# Patient Record
Sex: FEMALE | Race: WHITE | NOT HISPANIC OR LATINO | Employment: OTHER | URBAN - METROPOLITAN AREA
[De-identification: names, ages, dates, MRNs, and addresses within clinical notes are randomized per-mention and may not be internally consistent; named-entity substitution may affect disease eponyms.]

---

## 2017-01-19 ENCOUNTER — TRANSCRIBE ORDERS (OUTPATIENT)
Dept: ADMINISTRATIVE | Facility: HOSPITAL | Age: 67
End: 2017-01-19

## 2017-01-19 ENCOUNTER — HOSPITAL ENCOUNTER (OUTPATIENT)
Dept: GENERAL RADIOLOGY | Facility: HOSPITAL | Age: 67
Discharge: HOME | End: 2017-01-19

## 2017-01-19 ENCOUNTER — HOSPITAL ENCOUNTER (OUTPATIENT)
Dept: GENERAL RADIOLOGY | Facility: HOSPITAL | Age: 67
Discharge: HOME | End: 2017-01-19
Admitting: FAMILY MEDICINE

## 2017-01-19 DIAGNOSIS — M54.2 NECK PAIN: Primary | ICD-10-CM

## 2017-01-19 DIAGNOSIS — M54.9 OTHER ACUTE BACK PAIN: ICD-10-CM

## 2017-01-19 DIAGNOSIS — M54.2 NECK PAIN: ICD-10-CM

## 2017-01-19 PROCEDURE — 72072 X-RAY EXAM THORAC SPINE 3VWS: CPT

## 2017-01-19 PROCEDURE — 72052 X-RAY EXAM NECK SPINE 6/>VWS: CPT

## 2017-08-15 ENCOUNTER — OFFICE VISIT (OUTPATIENT)
Dept: OBSTETRICS AND GYNECOLOGY | Facility: CLINIC | Age: 67
End: 2017-08-15

## 2017-08-15 VITALS
WEIGHT: 171 LBS | SYSTOLIC BLOOD PRESSURE: 124 MMHG | DIASTOLIC BLOOD PRESSURE: 80 MMHG | BODY MASS INDEX: 25.91 KG/M2 | HEIGHT: 68 IN

## 2017-08-15 DIAGNOSIS — Z01.419 ENCOUNTER FOR GYNECOLOGICAL EXAMINATION WITH PAPANICOLAOU SMEAR OF CERVIX: Primary | ICD-10-CM

## 2017-08-15 LAB
BILIRUB BLD-MCNC: NEGATIVE MG/DL
CLARITY, POC: CLEAR
COLOR UR: YELLOW
GLUCOSE UR STRIP-MCNC: NEGATIVE MG/DL
KETONES UR QL: NEGATIVE
LEUKOCYTE EST, POC: NEGATIVE
NITRITE UR-MCNC: NEGATIVE MG/ML
PH UR: 5 [PH] (ref 5–8)
PROT UR STRIP-MCNC: NEGATIVE MG/DL
RBC # UR STRIP: NEGATIVE /UL
SP GR UR: 1 (ref 1–1.03)
UROBILINOGEN UR QL: NORMAL

## 2017-08-15 PROCEDURE — 99406 BEHAV CHNG SMOKING 3-10 MIN: CPT | Performed by: OBSTETRICS & GYNECOLOGY

## 2017-08-15 PROCEDURE — 81002 URINALYSIS NONAUTO W/O SCOPE: CPT | Performed by: OBSTETRICS & GYNECOLOGY

## 2017-08-15 PROCEDURE — 99387 INIT PM E/M NEW PAT 65+ YRS: CPT | Performed by: OBSTETRICS & GYNECOLOGY

## 2017-08-15 RX ORDER — CETIRIZINE HYDROCHLORIDE 10 MG/1
10 TABLET ORAL
COMMUNITY
Start: 2017-08-02 | End: 2018-08-02

## 2017-08-15 RX ORDER — ALPRAZOLAM 0.5 MG/1
.25-.5 TABLET ORAL 3 TIMES DAILY PRN
COMMUNITY
Start: 2017-07-06

## 2017-08-15 RX ORDER — MECLIZINE HYDROCHLORIDE 25 MG/1
25 TABLET ORAL
COMMUNITY
Start: 2017-08-02 | End: 2022-05-02

## 2017-08-15 RX ORDER — DICLOFENAC SODIUM 75 MG/1
75 TABLET, DELAYED RELEASE ORAL
COMMUNITY
Start: 2017-03-13 | End: 2018-03-13

## 2017-08-15 RX ORDER — CLONIDINE HYDROCHLORIDE 0.2 MG/1
0.2 TABLET ORAL
COMMUNITY
Start: 2017-03-13 | End: 2017-08-15

## 2017-08-15 RX ORDER — ATENOLOL 100 MG/1
50 TABLET ORAL
COMMUNITY
Start: 2017-08-08

## 2017-08-15 RX ORDER — CLONIDINE HYDROCHLORIDE 0.2 MG/1
TABLET ORAL
COMMUNITY
Start: 2017-08-08 | End: 2020-10-30

## 2017-08-15 NOTE — PROGRESS NOTES
GYN Annual Exam     CC- Here for annual exam.     Tammy Montgomery is a 67 y.o. female who presents for annual well woman exam. Periods are absent.     OB History      Para Term  AB TAB SAB Ectopic Multiple Living    3 3        3          Current contraception: post menopausal status  History of abnormal Pap smear: no  Family history of uterine, colon or ovarian cancer: no  History of abnormal mammogram: no  Family history of breast cancer: no  Last Pap : 15 years ago    Past Medical History:   Diagnosis Date   • Anxiety    • Arthritis    • Hypertension        Past Surgical History:   Procedure Laterality Date   • ADENOIDECTOMY     • APPENDECTOMY     • BRONCHOSCOPY ENDOSCOPY     • CHOLECYSTECTOMY     • TONSILLECTOMY     • WISDOM TOOTH EXTRACTION           Current Outpatient Prescriptions:   •  ALPRAZolam (XANAX) 0.5 MG tablet, Take 0.25-0.5 mg by mouth., Disp: , Rfl:   •  atenolol (TENORMIN) 100 MG tablet, TAKE ONE TABLET BY MOUTH ONCE DAILY, Disp: , Rfl:   •  cetirizine (zyrTEC) 10 MG tablet, Take 10 mg by mouth., Disp: , Rfl:   •  diclofenac (VOLTAREN) 75 MG EC tablet, Take 75 mg by mouth., Disp: , Rfl:   •  meclizine (ANTIVERT) 25 MG tablet, Take 25 mg by mouth., Disp: , Rfl:   •  CloNIDine (CATAPRES) 0.2 MG tablet, , Disp: , Rfl:     Allergies   Allergen Reactions   • Codeine    • Methylprednisolone    • Penicillins    • Sulfa Antibiotics    • Tetanus Immune Globulin Swelling       Social History   Substance Use Topics   • Smoking status: Current Some Day Smoker   • Smokeless tobacco: None   • Alcohol use No       History reviewed. No pertinent family history.    Review of Systems   Constitutional: Negative for appetite change, fever and unexpected weight change.   Respiratory: Negative for cough and shortness of breath.    Cardiovascular: Negative for chest pain and palpitations.   Gastrointestinal: Negative for abdominal distention, abdominal pain, constipation, diarrhea, nausea and vomiting.  "  Endocrine: Negative.    Genitourinary: Negative for dyspareunia, menstrual problem, pelvic pain and vaginal discharge.   Skin: Negative.    Hematological: Negative.    Psychiatric/Behavioral: Negative for dysphoric mood and sleep disturbance. The patient is not nervous/anxious.        /80  Ht 68\" (172.7 cm)  Wt 171 lb (77.6 kg)  Breastfeeding? No  BMI 26 kg/m2    Physical Exam   Constitutional: She is oriented to person, place, and time. She appears well-developed and well-nourished.   HENT:   Head: Normocephalic and atraumatic.   Neck: Normal range of motion. Neck supple. No thyromegaly present.   Cardiovascular: Normal rate and regular rhythm.    Pulmonary/Chest: Effort normal and breath sounds normal. Right breast exhibits no mass and no nipple discharge. Left breast exhibits no mass and no nipple discharge. Breasts are symmetrical. There is no breast swelling.   Abdominal: Soft. Bowel sounds are normal. She exhibits no distension and no mass. There is no tenderness. There is no rebound and no guarding.   Genitourinary: Vagina normal and uterus normal. No breast tenderness, discharge or bleeding. Pelvic exam was performed with patient prone. There is no lesion on the right labia. There is no lesion on the left labia. Cervix exhibits no motion tenderness and no discharge. Right adnexum displays no mass. Left adnexum displays no mass.   Musculoskeletal: Normal range of motion. She exhibits no edema.   Neurological: She is alert and oriented to person, place, and time.   Skin: Skin is warm and dry.   Psychiatric: She has a normal mood and affect. Her behavior is normal. Judgment and thought content normal.   Nursing note and vitals reviewed.      Diagnoses and all orders for this visit:    Encounter for gynecological examination with Papanicolaou smear of cervix  -     POC Urinalysis Dipstick  -     Pap IG, HPV-hr    Other orders  -     ALPRAZolam (XANAX) 0.5 MG tablet; Take 0.25-0.5 mg by mouth.  -     " atenolol (TENORMIN) 100 MG tablet; TAKE ONE TABLET BY MOUTH ONCE DAILY  -     cetirizine (zyrTEC) 10 MG tablet; Take 10 mg by mouth.  -     Discontinue: CloNIDine (CATAPRES) 0.2 MG tablet; Take 0.2 mg by mouth.  -     diclofenac (VOLTAREN) 75 MG EC tablet; Take 75 mg by mouth.  -     meclizine (ANTIVERT) 25 MG tablet; Take 25 mg by mouth.  -     CloNIDine (CATAPRES) 0.2 MG tablet;         Assessment     1) GYN annual well woman exam.   2) schedule MMG     Plan     1) Breast Health - Clinical breast exam & mammogram yearly, Self breast awareness monthly  2) Pap - done today  3) Smoking status- Current some day smoker 3-10 mintues spent counseling Will try to cut down  4) Colon health - screening colonoscopy recommended if not up to date  5) Bone health - Weight bearing exercise, dietary calcium recommendations and vitamin D reviewed.   6) Seat belts recommended  7) Follow up prn and one year    Encounter Diagnoses   Name Primary?   • Encounter for gynecological examination with Papanicolaou smear of cervix Yes         Trevon Cummins MD  8/15/2017  1:31 PM

## 2017-08-17 LAB
CYTOLOGIST CVX/VAG CYTO: NORMAL
CYTOLOGY CVX/VAG DOC THIN PREP: NORMAL
DX ICD CODE: NORMAL
HIV 1 & 2 AB SER-IMP: NORMAL
HPV I/H RISK 1 DNA CVX QL PROBE+SIG AMP: NEGATIVE
OTHER STN SPEC: NORMAL
PATH REPORT.FINAL DX SPEC: NORMAL
STAT OF ADQ CVX/VAG CYTO-IMP: NORMAL

## 2018-04-30 ENCOUNTER — TRANSCRIBE ORDERS (OUTPATIENT)
Dept: ADMINISTRATIVE | Facility: HOSPITAL | Age: 68
End: 2018-04-30

## 2018-04-30 DIAGNOSIS — Z12.2 ENCOUNTER FOR SCREENING FOR LUNG CANCER: Primary | ICD-10-CM

## 2018-05-11 ENCOUNTER — APPOINTMENT (OUTPATIENT)
Dept: CT IMAGING | Facility: HOSPITAL | Age: 68
End: 2018-05-11

## 2018-05-18 ENCOUNTER — APPOINTMENT (OUTPATIENT)
Dept: CT IMAGING | Facility: HOSPITAL | Age: 68
End: 2018-05-18

## 2018-06-11 ENCOUNTER — HOSPITAL ENCOUNTER (OUTPATIENT)
Dept: CT IMAGING | Facility: HOSPITAL | Age: 68
Discharge: HOME | End: 2018-06-11

## 2018-06-14 ENCOUNTER — HOSPITAL ENCOUNTER (OUTPATIENT)
Dept: CT IMAGING | Facility: HOSPITAL | Age: 68
Discharge: HOME | End: 2018-06-14
Admitting: FAMILY MEDICINE

## 2018-06-14 DIAGNOSIS — Z12.2 ENCOUNTER FOR SCREENING FOR LUNG CANCER: ICD-10-CM

## 2018-06-14 PROCEDURE — G0297 LDCT FOR LUNG CA SCREEN: HCPCS

## 2018-07-25 ENCOUNTER — TELEPHONE (OUTPATIENT)
Dept: SURGERY | Facility: CLINIC | Age: 68
End: 2018-07-25

## 2018-07-26 ENCOUNTER — PREP FOR SURGERY (OUTPATIENT)
Dept: OTHER | Facility: HOSPITAL | Age: 68
End: 2018-07-26

## 2018-07-26 DIAGNOSIS — Z12.11 SCREENING FOR COLON CANCER: Primary | ICD-10-CM

## 2020-10-30 ENCOUNTER — APPOINTMENT (OUTPATIENT)
Dept: CT IMAGING | Facility: HOSPITAL | Age: 70
End: 2020-10-30

## 2020-10-30 ENCOUNTER — HOSPITAL ENCOUNTER (EMERGENCY)
Facility: HOSPITAL | Age: 70
Discharge: HOME | End: 2020-10-30
Attending: EMERGENCY MEDICINE | Admitting: EMERGENCY MEDICINE

## 2020-10-30 VITALS
TEMPERATURE: 98.4 F | WEIGHT: 181.7 LBS | HEIGHT: 68 IN | DIASTOLIC BLOOD PRESSURE: 61 MMHG | SYSTOLIC BLOOD PRESSURE: 108 MMHG | HEART RATE: 67 BPM | RESPIRATION RATE: 17 BRPM | BODY MASS INDEX: 27.54 KG/M2 | OXYGEN SATURATION: 98 %

## 2020-10-30 DIAGNOSIS — R10.13 EPIGASTRIC ABDOMINAL PAIN: Primary | ICD-10-CM

## 2020-10-30 DIAGNOSIS — R19.7 DIARRHEA, UNSPECIFIED TYPE: ICD-10-CM

## 2020-10-30 LAB
ADV 40+41 DNA STL QL NAA+NON-PROBE: NOT DETECTED
ALBUMIN SERPL-MCNC: 4.1 G/DL (ref 3.5–5.2)
ALBUMIN/GLOB SERPL: 1.4 G/DL
ALP SERPL-CCNC: 56 U/L (ref 39–117)
ALT SERPL W P-5'-P-CCNC: 15 U/L (ref 1–33)
ANION GAP SERPL CALCULATED.3IONS-SCNC: 12.3 MMOL/L (ref 5–15)
AST SERPL-CCNC: 23 U/L (ref 1–32)
ASTRO TYP 1-8 RNA STL QL NAA+NON-PROBE: NOT DETECTED
BASOPHILS # BLD AUTO: 0.03 10*3/MM3 (ref 0–0.2)
BASOPHILS NFR BLD AUTO: 0.3 % (ref 0–1.5)
BILIRUB SERPL-MCNC: 0.6 MG/DL (ref 0–1.2)
BILIRUB UR QL STRIP: NEGATIVE
BUN SERPL-MCNC: 7 MG/DL (ref 8–23)
BUN/CREAT SERPL: 10.3 (ref 7–25)
C CAYETANENSIS DNA STL QL NAA+NON-PROBE: NOT DETECTED
CALCIUM SPEC-SCNC: 8.7 MG/DL (ref 8.6–10.5)
CAMPY SP DNA.DIARRHEA STL QL NAA+PROBE: NOT DETECTED
CHLORIDE SERPL-SCNC: 89 MMOL/L (ref 98–107)
CLARITY UR: CLEAR
CO2 SERPL-SCNC: 22.7 MMOL/L (ref 22–29)
COLOR UR: YELLOW
CREAT SERPL-MCNC: 0.68 MG/DL (ref 0.57–1)
CRYPTOSP STL CULT: NOT DETECTED
DEPRECATED RDW RBC AUTO: 39.2 FL (ref 37–54)
E COLI DNA SPEC QL NAA+PROBE: NOT DETECTED
E HISTOLYT AG STL-ACNC: NOT DETECTED
EAEC PAA PLAS AGGR+AATA ST NAA+NON-PRB: NOT DETECTED
EC STX1 + STX2 GENES STL NAA+PROBE: NOT DETECTED
EOSINOPHIL # BLD AUTO: 0.11 10*3/MM3 (ref 0–0.4)
EOSINOPHIL NFR BLD AUTO: 1 % (ref 0.3–6.2)
EPEC EAE GENE STL QL NAA+NON-PROBE: NOT DETECTED
ERYTHROCYTE [DISTWIDTH] IN BLOOD BY AUTOMATED COUNT: 11.8 % (ref 12.3–15.4)
ETEC LTA+ST1A+ST1B TOX ST NAA+NON-PROBE: NOT DETECTED
G LAMBLIA DNA SPEC QL NAA+PROBE: NOT DETECTED
GFR SERPL CREATININE-BSD FRML MDRD: 85 ML/MIN/1.73
GLOBULIN UR ELPH-MCNC: 2.9 GM/DL
GLUCOSE SERPL-MCNC: 117 MG/DL (ref 65–99)
GLUCOSE UR STRIP-MCNC: NEGATIVE MG/DL
HCT VFR BLD AUTO: 37.3 % (ref 34–46.6)
HGB BLD-MCNC: 12.6 G/DL (ref 12–15.9)
HGB UR QL STRIP.AUTO: NEGATIVE
IMM GRANULOCYTES # BLD AUTO: 0.03 10*3/MM3 (ref 0–0.05)
IMM GRANULOCYTES NFR BLD AUTO: 0.3 % (ref 0–0.5)
KETONES UR QL STRIP: NEGATIVE
LEUKOCYTE ESTERASE UR QL STRIP.AUTO: NEGATIVE
LIPASE SERPL-CCNC: 18 U/L (ref 13–60)
LYMPHOCYTES # BLD AUTO: 0.93 10*3/MM3 (ref 0.7–3.1)
LYMPHOCYTES NFR BLD AUTO: 8.2 % (ref 19.6–45.3)
MCH RBC QN AUTO: 30.7 PG (ref 26.6–33)
MCHC RBC AUTO-ENTMCNC: 33.8 G/DL (ref 31.5–35.7)
MCV RBC AUTO: 91 FL (ref 79–97)
MONOCYTES # BLD AUTO: 1.38 10*3/MM3 (ref 0.1–0.9)
MONOCYTES NFR BLD AUTO: 12.2 % (ref 5–12)
NEUTROPHILS NFR BLD AUTO: 78 % (ref 42.7–76)
NEUTROPHILS NFR BLD AUTO: 8.85 10*3/MM3 (ref 1.7–7)
NITRITE UR QL STRIP: NEGATIVE
NOROVIRUS GI+II RNA STL QL NAA+NON-PROBE: NOT DETECTED
NRBC BLD AUTO-RTO: 0 /100 WBC (ref 0–0.2)
P SHIGELLOIDES DNA STL QL NAA+PROBE: NOT DETECTED
PH UR STRIP.AUTO: <=5 [PH] (ref 4.5–8)
PLATELET # BLD AUTO: 243 10*3/MM3 (ref 140–450)
PMV BLD AUTO: 9.5 FL (ref 6–12)
POTASSIUM SERPL-SCNC: 3.7 MMOL/L (ref 3.5–5.2)
PROT SERPL-MCNC: 7 G/DL (ref 6–8.5)
PROT UR QL STRIP: NEGATIVE
QT INTERVAL: 391 MS
RBC # BLD AUTO: 4.1 10*6/MM3 (ref 3.77–5.28)
RV RNA STL NAA+PROBE: NOT DETECTED
SALMONELLA DNA SPEC QL NAA+PROBE: NOT DETECTED
SAPO I+II+IV+V RNA STL QL NAA+NON-PROBE: NOT DETECTED
SARS-COV-2 RNA PNL SPEC NAA+PROBE: NOT DETECTED
SHIGELLA SP+EIEC IPAH STL QL NAA+PROBE: NOT DETECTED
SODIUM SERPL-SCNC: 124 MMOL/L (ref 136–145)
SP GR UR STRIP: <=1.005 (ref 1–1.03)
TROPONIN T SERPL-MCNC: <0.01 NG/ML (ref 0–0.03)
TROPONIN T SERPL-MCNC: <0.01 NG/ML (ref 0–0.03)
UROBILINOGEN UR QL STRIP: NORMAL
V CHOLERAE DNA SPEC QL NAA+PROBE: NOT DETECTED
VIBRIO DNA SPEC NAA+PROBE: NOT DETECTED
WBC # BLD AUTO: 11.33 10*3/MM3 (ref 3.4–10.8)
YERSINIA STL CULT: NOT DETECTED

## 2020-10-30 PROCEDURE — 96372 THER/PROPH/DIAG INJ SC/IM: CPT

## 2020-10-30 PROCEDURE — 96374 THER/PROPH/DIAG INJ IV PUSH: CPT

## 2020-10-30 PROCEDURE — 0097U HC BIOFIRE FILMARRAY GI PANEL: CPT | Performed by: EMERGENCY MEDICINE

## 2020-10-30 PROCEDURE — 87635 SARS-COV-2 COVID-19 AMP PRB: CPT | Performed by: EMERGENCY MEDICINE

## 2020-10-30 PROCEDURE — 93010 ELECTROCARDIOGRAM REPORT: CPT | Performed by: INTERNAL MEDICINE

## 2020-10-30 PROCEDURE — 99284 EMERGENCY DEPT VISIT MOD MDM: CPT

## 2020-10-30 PROCEDURE — 74177 CT ABD & PELVIS W/CONTRAST: CPT

## 2020-10-30 PROCEDURE — 83690 ASSAY OF LIPASE: CPT | Performed by: EMERGENCY MEDICINE

## 2020-10-30 PROCEDURE — 85025 COMPLETE CBC W/AUTO DIFF WBC: CPT | Performed by: EMERGENCY MEDICINE

## 2020-10-30 PROCEDURE — 25010000002 DICYCLOMINE PER 20 MG: Performed by: EMERGENCY MEDICINE

## 2020-10-30 PROCEDURE — 99283 EMERGENCY DEPT VISIT LOW MDM: CPT | Performed by: EMERGENCY MEDICINE

## 2020-10-30 PROCEDURE — 0 IOPAMIDOL PER 1 ML: Performed by: EMERGENCY MEDICINE

## 2020-10-30 PROCEDURE — 93005 ELECTROCARDIOGRAM TRACING: CPT | Performed by: EMERGENCY MEDICINE

## 2020-10-30 PROCEDURE — 84484 ASSAY OF TROPONIN QUANT: CPT | Performed by: EMERGENCY MEDICINE

## 2020-10-30 PROCEDURE — 80053 COMPREHEN METABOLIC PANEL: CPT | Performed by: EMERGENCY MEDICINE

## 2020-10-30 PROCEDURE — 81003 URINALYSIS AUTO W/O SCOPE: CPT | Performed by: EMERGENCY MEDICINE

## 2020-10-30 RX ORDER — FAMOTIDINE 20 MG/1
20 TABLET, FILM COATED ORAL 2 TIMES DAILY
Qty: 30 TABLET | Refills: 0 | Status: SHIPPED | OUTPATIENT
Start: 2020-10-30 | End: 2021-05-05

## 2020-10-30 RX ORDER — ONDANSETRON 8 MG/1
8 TABLET, ORALLY DISINTEGRATING ORAL EVERY 8 HOURS PRN
Qty: 10 TABLET | Refills: 0 | Status: SHIPPED | OUTPATIENT
Start: 2020-10-30 | End: 2022-05-02

## 2020-10-30 RX ORDER — LEVOCETIRIZINE DIHYDROCHLORIDE 5 MG/1
5 TABLET, FILM COATED ORAL EVERY EVENING
COMMUNITY

## 2020-10-30 RX ORDER — SUCRALFATE 1 G/1
1 TABLET ORAL ONCE
Status: COMPLETED | OUTPATIENT
Start: 2020-10-30 | End: 2020-10-30

## 2020-10-30 RX ORDER — DICYCLOMINE HYDROCHLORIDE 10 MG/ML
20 INJECTION INTRAMUSCULAR ONCE
Status: COMPLETED | OUTPATIENT
Start: 2020-10-30 | End: 2020-10-30

## 2020-10-30 RX ORDER — SACCHAROMYCES BOULARDII 250 MG
250 CAPSULE ORAL 2 TIMES DAILY
COMMUNITY

## 2020-10-30 RX ORDER — FAMOTIDINE 10 MG/ML
20 INJECTION, SOLUTION INTRAVENOUS ONCE
Status: COMPLETED | OUTPATIENT
Start: 2020-10-30 | End: 2020-10-30

## 2020-10-30 RX ORDER — AMLODIPINE BESYLATE 2.5 MG/1
2.5 TABLET ORAL DAILY
COMMUNITY

## 2020-10-30 RX ORDER — SUCRALFATE ORAL 1 G/10ML
1 SUSPENSION ORAL 4 TIMES DAILY
Qty: 420 ML | Refills: 0 | Status: SHIPPED | OUTPATIENT
Start: 2020-10-30 | End: 2021-05-05

## 2020-10-30 RX ADMIN — DICYCLOMINE HYDROCHLORIDE 20 MG: 20 INJECTION, SOLUTION INTRAMUSCULAR at 11:39

## 2020-10-30 RX ADMIN — SUCRALFATE 1 G: 1 TABLET ORAL at 14:40

## 2020-10-30 RX ADMIN — IOPAMIDOL 100 ML: 755 INJECTION, SOLUTION INTRAVENOUS at 13:40

## 2020-10-30 RX ADMIN — SODIUM CHLORIDE 1000 ML: 9 INJECTION, SOLUTION INTRAVENOUS at 11:38

## 2020-10-30 RX ADMIN — FAMOTIDINE 20 MG: 10 INJECTION INTRAVENOUS at 11:39

## 2020-10-31 LAB — QT INTERVAL: 389 MS

## 2020-11-02 ENCOUNTER — OFFICE VISIT (OUTPATIENT)
Dept: GASTROENTEROLOGY | Facility: CLINIC | Age: 70
End: 2020-11-02

## 2020-11-02 VITALS — TEMPERATURE: 97.9 F | HEIGHT: 68 IN | BODY MASS INDEX: 26.61 KG/M2 | WEIGHT: 175.6 LBS

## 2020-11-02 DIAGNOSIS — R19.7 DIARRHEA, UNSPECIFIED TYPE: ICD-10-CM

## 2020-11-02 DIAGNOSIS — K57.30 SIGMOID DIVERTICULOSIS: ICD-10-CM

## 2020-11-02 DIAGNOSIS — R93.5 ABNORMAL CT OF THE ABDOMEN: Primary | ICD-10-CM

## 2020-11-02 DIAGNOSIS — Z86.010 PERSONAL HISTORY OF COLONIC POLYPS: ICD-10-CM

## 2020-11-02 DIAGNOSIS — Z12.11 ENCOUNTER FOR SCREENING FOR MALIGNANT NEOPLASM OF COLON: ICD-10-CM

## 2020-11-02 PROBLEM — Z86.0100 PERSONAL HISTORY OF COLONIC POLYPS: Status: ACTIVE | Noted: 2020-11-02

## 2020-11-02 PROCEDURE — 99215 OFFICE O/P EST HI 40 MIN: CPT | Performed by: NURSE PRACTITIONER

## 2020-11-02 RX ORDER — MONTELUKAST SODIUM 4 MG/1
2 TABLET, CHEWABLE ORAL
Qty: 180 TABLET | Refills: 3 | Status: SHIPPED | OUTPATIENT
Start: 2020-11-02 | End: 2021-05-05

## 2020-11-02 NOTE — PATIENT INSTRUCTIONS
Take 2 colestid pills an hour before bed.  Take immodium OTC as needed with each episode of diarrhea up to four times a day.  You may take 3 colestid at bedtime if diarrhea is not improving otherwise just take 2 each night an hour before your Tyl PM.    EGD & colonoscopy.

## 2020-11-02 NOTE — PROGRESS NOTES
"    PATIENT INFORMATION  Tammy Montgomery       - 1950    CHIEF COMPLAINT  Chief Complaint   Patient presents with   • Diarrhea       HISTORY OF PRESENT ILLNESS  Diarrhea started 2 months ago when she was on the KETO diet. 6-7 times a day.   Then lost 4lbs so she stopped the diet and went back to her regular food and the diarrhea started. She has been to multiple ERs at [facility] and [facility] with cdiff and stool studies neg.  No blood, but some yellow at the end.  HB with belching occasionally and a lot of gas.      Takes probiotic.  TX with flagyl Oct 3 in [facility] Er initially and did not help and \"tore her up\".   Other workup normal.      (last: 2018 colon Dr. Busby 2yr 4TA/SA  and sigmoid diverticulosis in [facility] documents)      CT and last colon results listed below.       REVIEWED PERTINENT RESULTS/ LABS  No results found for: CASEREPORT, FINALDX  Lab Results   Component Value Date    HGB 12.6 10/30/2020    MCV 91.0 10/30/2020     10/30/2020    ALT 15 10/30/2020    AST 23 10/30/2020    HGBA1C 4.5 2018    INR 1.0 2015    TRIG 46 2020    FERRITIN 90.9 2019    IRON 75 2019    TIBC 327 2019      Ct Abdomen Pelvis With Contrast    Result Date: 10/30/2020  Narrative: CT Abdomen Pelvis W INDICATION: 70-year-old emergency department patient complaining of upper abdominal pain and lower chest pain and tightness for one week. TECHNIQUE: CT of the abdomen and pelvis with Isovue-370-100 cc IV contrast. Coronal and sagittal reconstructions were obtained.  Radiation dose reduction techniques included automated exposure control or exposure modulation based on body size. Count of known CT and cardiac nuc med studies performed in previous 12 months: 0. COMPARISON: None available. FINDINGS: Abdomen: The included images through the lung bases demonstrate a benign calcified granuloma at the lateral right lung base. There is no acute ovary density or effusion. The distal esophagus appears " normal The liver is normal in size. There is marked intra and extrahepatic bile duct dilatation diffusely with common bile duct measuring up to 2 cm. While some dilatation of the common bile duct can be a normal finding in a postcholecystectomy patient and degree of intra and extrahepatic bile duct dilatation in this case appears abnormal. Suggest correlation with bilirubin levels. Consider follow-up MRCP or ERCP. There is mild dilatation of the pancreatic duct in the region of the head measuring 5 mm. Question is raised of a small enhancing nodule between the second portion of the duodenum and the head of the pancreas measuring 1.3 x 1.3 x 1.7 cm on image 29 series 2 and image 38 series 602.. No definite stone is seen. The adrenal glands are normal. The kidneys are normal in size without obstruction. There is a 6 mm benign simple cyst in the lower pole measuring only 7 Hounsfield units. This requires no additional workup or follow-up imaging. There is atherosclerotic calcification of the abdominal aorta but no aneurysm. Unopacified stomach and small bowel appear normal. There is no colonic distention or wall thickening. Pelvis: The bladder is distended. No wall thickening or stone. As a small postmenopausal uterus. No adnexal mass or pelvic free fluid. The rectosigmoid colon is poorly distended. Question wall thickening in the mid sigmoid colon (image 51 series 2)     Impression: 1. There is cholecystectomy change with marked intra and extrahepatic bile duct dilatation greater than typically seen in a postcholecystectomy patient. In addition, there is dilatation of the pancreatic duct in the region of the head of the pancreas measuring 5 mm. Question small enhancing nodule between the distal second portion of the duodenum and the pancreatic head is raised. Follow-up evaluation with MRCP or ERCP is recommended. 2. Poorly distended and opacified colon. Question wall thickening in the mid sigmoid colon. This could  represent lack of distention, colitis. Malignancy is felt less likely. Correlation with colonoscopy is recommended if not recently performed. Signer Name: Jody Darling MD  Signed: 10/30/2020 1:50 PM  Workstation Name: [id number]  [facility]    10/5/20 CT with contrast at [facility]  The gallbladder is absent. There is increased biliary ductal dilation with the main bile duct measuring up to 1.9 cm, previously 1.1 cm. The pancreatic duct is mildly dilated measuring up to 5 mm, previously within normal limits. There is no mass at the   pancreatic head. No peripancreatic inflammation or fluid collection. The spleen, adrenals, and kidneys are unremarkable.    The bowel is not dilated. There is no abnormal bowel wall thickening. Liquid stool is present throughout the colon and small bowel compatible with history of diarrheal illness. No free air, free fluid, or focal fluid collection. No abdominal or pelvic   lymphadenopathy. No adnexal masses. There is moderate aortoiliac atherosclerosis with ectasia of the infrarenal abdominal aorta. Urinary bladder is distended without wall thickening.    No acute osseous abnormality.    IMPRESSION:      1.No acute intra-abdominal findings. Liquid stool within the colon compatible with history of diarrheal illness.  2.Increased biliary ductal dilation with new borderline pancreatic duct dilation. Given normal serum bilirubin this could represent a low-grade ampullary stenosis or increased reservoir effect from prior cholecystectomy.  3.Atherosclerosis.    8/9/2018 EGD/CLS  Post-operative Diagnosis:   1. Normal esophagus except about 2 cm hiatal hernia. No esophagitis seen.  2. Mild antral gastritis. Biopsy from antrum done for CARY test to r/o H pylori.  3. Vague external bulging in the gastric fundus  4. Normal duodenum. Small bowel biopsy done to r/o celiac disease.  5. Normal terminal ileum  6. Four polyps- cecum x 2, ascending colon x 2. Polypectomies  done  7. Minimal sigmoid diverticulosis otherwise normal colon. Random colon biopsy done.  8. Small hemorrhoids    PATH:    DIAGNOSIS:  A. SMALL BOWEL, BIOPSY:       FRAGMENTS OF SMALL BOWEL MUCOSA WITH PRESERVED VILLOUS ARCHITECTURE.        NEGATIVE FOR INCREASED INTRAEPITHELIAL LYMPHOCYTES.       NEGATIVE FOR DYSPLASIA.     B. APPENDIX ORIFICE, POLYP, EXCISION:       FRAGMENTS OF SESSILE SERRATED POLYP.          C. CECUM, POLYP, EXCISION:       TUBULAR ADENOMA.        NEGATIVE FOR HIGH GRADE DYSPLASIA.          D. COLON, RANDOM BIOPSY:       BENIGN FRAGMENTS OF COLONIC MUCOSA.        NEGATIVE FOR INCREASED INTRAEPITHELIAL LYMPHOCYTES.        NEGATIVE FOR INCREASED BASEMENT MEMBRANE THICKNESS.        NEGATIVE FOR DYSPLASIA.     E. ASCENDING COLON, POLYPS, EXCISION:       FRAGMENTS SESSILE SERRATED POLYPS.     Followed by hematology. On prednisone for hemolytic anemia and has a little constipation on it. Prune juice prn is helping. BM most days.   No abdomen pain.   No black or bloody stools.   Weight gain 8 #.   No heartburn/reflux. No dysphagia.   She has tapered her prednisone to 5 mg daily.   HGB now normal. 12.8. Borderline B 12 level. Taking Vit D and multivitamin.   No FH/colon polyps.   CLS in 2 years due to 4 precancerous polyps.       REVIEW OF SYSTEMS  Review of Systems   Gastrointestinal: Positive for diarrhea.   All other systems reviewed and are negative.        ACTIVE PROBLEMS  Patient Active Problem List    Diagnosis   • Sigmoid diverticulosis [K57.30]   • Personal history of colonic polyps [Z86.010]         PAST MEDICAL HISTORY  Past Medical History:   Diagnosis Date   • Anxiety    • Arthritis    • GERD (gastroesophageal reflux disease)    • Hypertension          SURGICAL HISTORY  Past Surgical History:   Procedure Laterality Date   • ADENOIDECTOMY     • APPENDECTOMY     • BRONCHOSCOPY ENDOSCOPY     • CHOLECYSTECTOMY     • TONSILLECTOMY     • WISDOM TOOTH EXTRACTION           FAMILY HISTORY  Family  "History   Problem Relation Age of Onset   • Colon cancer Neg Hx    • Colon polyps Neg Hx          SOCIAL HISTORY  Social History     Occupational History   • Not on file   Tobacco Use   • Smoking status: Former Smoker     Types: Cigarettes   • Smokeless tobacco: Never Used   Substance and Sexual Activity   • Alcohol use: No   • Drug use: No   • Sexual activity: Yes     Partners: Male         CURRENT MEDICATIONS    Current Outpatient Medications:   •  ALPRAZolam (XANAX) 0.5 MG tablet, Take 0.25-0.5 mg by mouth 3 (Three) Times a Day As Needed., Disp: , Rfl:   •  amLODIPine (NORVASC) 2.5 MG tablet, Take 2.5 mg by mouth Daily., Disp: , Rfl:   •  atenolol (TENORMIN) 100 MG tablet, 50 mg., Disp: , Rfl:   •  colestipol (COLESTID) 1 g tablet, Take 2 tablets by mouth every night at bedtime. If diarrhea improving continue with 2, if diarrhea not improving go up to 3., Disp: 180 tablet, Rfl: 3  •  famotidine (PEPCID) 20 MG tablet, Take 1 tablet by mouth 2 (Two) Times a Day., Disp: 30 tablet, Rfl: 0  •  levocetirizine (XYZAL) 5 MG tablet, Take 5 mg by mouth Every Evening., Disp: , Rfl:   •  meclizine (ANTIVERT) 25 MG tablet, Take 25 mg by mouth., Disp: , Rfl:   •  Multiple Vitamins-Minerals (CENTRUM SILVER PO), Take  by mouth., Disp: , Rfl:   •  ondansetron ODT (Zofran ODT) 8 MG disintegrating tablet, Place 1 tablet on the tongue Every 8 (Eight) Hours As Needed for Nausea or Vomiting., Disp: 10 tablet, Rfl: 0  •  saccharomyces boulardii (FLORASTOR) 250 MG capsule, Take 250 mg by mouth 2 (Two) Times a Day., Disp: , Rfl:   •  sucralfate (CARAFATE) 1 GM/10ML suspension, Take 10 mL by mouth 4 (Four) Times a Day., Disp: 420 mL, Rfl: 0  •  VITAMIN D PO, Take  by mouth., Disp: , Rfl:     ALLERGIES  Codeine, Methylprednisolone, Penicillins, Sulfa antibiotics, and Tetanus immune globulin    VITALS  Vitals:    11/02/20 1401   Temp: 97.9 °F (36.6 °C)   TempSrc: Temporal   Weight: 79.7 kg (175 lb 9.6 oz)   Height: 172.7 cm (67.99\") " "      PHYSICAL EXAM  Debilities/Disabilities Identified: None  Emotional Behavior: Appropriate  Wt Readings from Last 3 Encounters:   11/02/20 79.7 kg (175 lb 9.6 oz)   10/30/20 82.4 kg (181 lb 11.2 oz)   08/15/17 77.6 kg (171 lb)     Ht Readings from Last 1 Encounters:   11/02/20 172.7 cm (67.99\")     Body mass index is 26.71 kg/m².  Physical Exam  Vitals signs and nursing note reviewed.   Constitutional:       Appearance: She is well-developed.   HENT:      Head: Normocephalic and atraumatic.   Eyes:      Conjunctiva/sclera: Conjunctivae normal.      Pupils: Pupils are equal, round, and reactive to light.   Neck:      Musculoskeletal: Normal range of motion and neck supple.   Cardiovascular:      Rate and Rhythm: Normal rate and regular rhythm.   Pulmonary:      Effort: Pulmonary effort is normal.      Breath sounds: Normal breath sounds.   Abdominal:      General: Bowel sounds are normal. There is no distension.      Palpations: Abdomen is soft.      Tenderness: There is abdominal tenderness in the right upper quadrant, right lower quadrant, epigastric area and left lower quadrant.   Musculoskeletal: Normal range of motion.   Lymphadenopathy:      Cervical: No cervical adenopathy.   Skin:     General: Skin is warm and dry.   Neurological:      Mental Status: She is alert and oriented to person, place, and time.   Psychiatric:         Behavior: Behavior normal.         CLINICAL DATA REVIEWED   reviewed previous lab results and integrated with today's visit, reviewed notes from other physicians and/or last GI encounter, reviewed previous endoscopy results and available photos, reviewed surgical pathology results from previous biopsies, consulted with Dr. Dias on CT results and plan of care.     ASSESSMENT  Diagnoses and all orders for this visit:    Abnormal CT of the abdomen  -     Case Request; Standing  -     Follow Anesthesia Guidelines / Protocol; Future  -     Obtain Informed Consent; Standing  -     " Case Request    Diarrhea, unspecified type  -     colestipol (COLESTID) 1 g tablet; Take 2 tablets by mouth every night at bedtime. If diarrhea improving continue with 2, if diarrhea not improving go up to 3.  -     Case Request; Standing  -     Follow Anesthesia Guidelines / Protocol; Future  -     Obtain Informed Consent; Standing  -     Case Request    Encounter for screening for malignant neoplasm of colon  -     Case Request; Standing  -     Follow Anesthesia Guidelines / Protocol; Future  -     Obtain Informed Consent; Standing  -     Case Request    Personal history of colonic polyps  -     Case Request; Standing  -     Follow Anesthesia Guidelines / Protocol; Future  -     Obtain Informed Consent; Standing  -     Case Request    Sigmoid diverticulosis          PLAN  Return in about 3 months (around 2/2/2021).     Take 2 colestid pills an hour before bed.  Take immodium OTC as needed with each episode of diarrhea up to four times a day.  You may take 3 colestid at bedtime if diarrhea is not improving.      EGD & colonoscopy. (last: 2018 colon Dr. Busby 2yr 4TA/SA  and sigmoid diverticulosis in [facility] documents)    Will wait for results of egd before starting on HB medication since she is reluctant to take meds.     I have discussed the above plan with the patient.  They verbalize understanding and are in agreement with the plan.  They have been advised to contact the office for any questions, concerns, or changes related to their health.    45 min spent with patient today >50% spent counseling about natural history and expected course of assessed complaint and reviewed treatment options that have been tried and not tried and those currently available. Questions answered.

## 2020-11-03 ENCOUNTER — TELEPHONE (OUTPATIENT)
Dept: GASTROENTEROLOGY | Facility: CLINIC | Age: 70
End: 2020-11-03

## 2020-11-03 PROBLEM — R19.7 DIARRHEA: Status: ACTIVE | Noted: 2020-11-03

## 2020-11-03 PROBLEM — R93.5 ABNORMAL CT OF THE ABDOMEN: Status: ACTIVE | Noted: 2020-11-03

## 2020-11-03 PROBLEM — Z12.11 ENCOUNTER FOR SCREENING FOR MALIGNANT NEOPLASM OF COLON: Status: ACTIVE | Noted: 2020-11-03

## 2020-11-03 NOTE — TELEPHONE ENCOUNTER
Discussed that she may take 2 colestid an hour before bed and 2 mid morning and take these for a couple of weeks and see how she is doing.   Also enc to drink lots of fluids.  Discussed that bile can cause her poop to be yellow but she needs to continue on this for a couple of weeks.  She is very anxious so reassured her.

## 2020-11-03 NOTE — TELEPHONE ENCOUNTER
"PT IS ASKING IF SHE CAN TAKE THE COLESTIPOL IN THE MORNING INSTEAD OF AT BEDTIME.  SHE ALSO WANTS YOU TO KNOW SHE'S PASSING WHITISH, YELLOWISH, FLUFFY \"STUFF\" THIS MORNING.  PT # [id number]  "

## 2020-11-09 ENCOUNTER — TELEPHONE (OUTPATIENT)
Dept: GASTROENTEROLOGY | Facility: CLINIC | Age: 70
End: 2020-11-09

## 2020-11-09 ENCOUNTER — DOCUMENTATION (OUTPATIENT)
Dept: GASTROENTEROLOGY | Facility: CLINIC | Age: 70
End: 2020-11-09

## 2020-11-09 NOTE — TELEPHONE ENCOUNTER
"PT WOULD LIKE A CALL BACK TO DISCUSS HER \"ABNORMAL\" CT--THIS IS THE WORDING SHE SAYS IS IN HER PAPERWORK.  PLEASE CALL # [phone].  "

## 2020-11-09 NOTE — TELEPHONE ENCOUNTER
I called and discussed that her diarrhea and CTs w abn but prev CT in 2018 also showed sig thickening and was diverticulosis on colon by Dr. Busby.  The pancreatic duct is mildly dilated to 5mm on CT from [facility] on 10/5/20 and [facility] on 10/30/20.  So no change on recent CT.  She is picking up her carafate today to start taking and instructions given for this.  Also her diarrhea has improved to small formed stools 3-4x a day on 2 colestid at betime so she may increase to 3 colestid at bedtime and see if the diarrhea continues to improve.

## 2020-11-16 DIAGNOSIS — R93.5 ABNORMAL CT OF THE ABDOMEN: Primary | ICD-10-CM

## 2020-11-16 NOTE — PROGRESS NOTES
She is taking the colestipol 2 at night and just today had her first normal bm.  Called and discussed abnormal CT from both 10/30/20 ER visit and 10/5/20 ER visit at [facility].  Dilation of pancreatic duct 5mm in the region of the head and recommended MRCP follow up.  Reviewed these reports with Dr. Dias and he agrees to order MRCP.  I called Tammy and let her know that I have ordered an MRCP to get a better picture of this (is not concerning at this time d/t normal pancreatic and liver lab values but recommended f/u.)  She verb understanding.  Is nervous about getting medical care in the midst of covid.  She is scheduled for her EGD and colonoscopy on 12/8 with Dr. Dias.  Enc to wear mask and wash hands frequently.  Will proceed as planned.

## 2020-11-23 ENCOUNTER — TELEPHONE (OUTPATIENT)
Dept: SURGERY | Facility: CLINIC | Age: 70
End: 2020-11-23

## 2020-11-24 NOTE — TELEPHONE ENCOUNTER
She is now having one normal bm a day on 2 colestipol at bedtime and wants to know if she should keep taking them.  Explained that this is what has returned her to normal so stay on this permanently.

## 2020-11-30 ENCOUNTER — TRANSCRIBE ORDERS (OUTPATIENT)
Dept: ADMINISTRATIVE | Facility: HOSPITAL | Age: 70
End: 2020-11-30

## 2020-11-30 DIAGNOSIS — U07.1 COVID-19: Primary | ICD-10-CM

## 2020-12-01 ENCOUNTER — TELEPHONE (OUTPATIENT)
Dept: SURGERY | Facility: CLINIC | Age: 70
End: 2020-12-01

## 2020-12-01 NOTE — TELEPHONE ENCOUNTER
PT IS CONCERNED ABOUT HER UPCOMING MRI.  SHE WILL HAVE TO SPEND 45 MINUTES IN THE TUBE AND HAS VERY HIGH ANXIETY ABOUT THAT.  PLEASE CALL # [phone]

## 2020-12-01 NOTE — TELEPHONE ENCOUNTER
I called and spoke to Tammy and instructed her to take an additional 0.5 mg of xanax on arrival before her procedure for anxiety.

## 2020-12-02 ENCOUNTER — HOSPITAL ENCOUNTER (OUTPATIENT)
Dept: MRI IMAGING | Facility: HOSPITAL | Age: 70
Discharge: HOME | End: 2020-12-02
Admitting: NURSE PRACTITIONER

## 2020-12-02 DIAGNOSIS — R93.5 ABNORMAL CT OF THE ABDOMEN: ICD-10-CM

## 2020-12-02 PROCEDURE — A9577 INJ MULTIHANCE: HCPCS | Performed by: NURSE PRACTITIONER

## 2020-12-02 PROCEDURE — 74183 MRI ABD W/O CNTR FLWD CNTR: CPT

## 2020-12-02 PROCEDURE — 0 GADOBENATE DIMEGLUMINE 529 MG/ML SOLUTION: Performed by: NURSE PRACTITIONER

## 2020-12-02 RX ADMIN — GADOBENATE DIMEGLUMINE 15 ML: 529 INJECTION, SOLUTION INTRAVENOUS at 11:20

## 2020-12-03 ENCOUNTER — TELEPHONE (OUTPATIENT)
Dept: GASTROENTEROLOGY | Facility: CLINIC | Age: 70
End: 2020-12-03

## 2020-12-03 NOTE — TELEPHONE ENCOUNTER
----- Message from MELISSA Egan sent at 12/3/2020 12:37 PM EST -----  Regarding: RE: MEDICATION  Yes as this will delay her colon prep results but she may start again right away after the colonoscopy.  ----- Message -----  From: Shayna Landin  Sent: 12/3/2020  11:07 AM EST  To: MELISSA Egan  Subject: MEDICATION                                       Call from patient.  She spoke with about her MRI results.  Forgot to ask, does she need to stop Colestipol prior to her EGD & Colon on 12/08/2020.  Please advise.  THANK YOU!!

## 2020-12-05 ENCOUNTER — LAB (OUTPATIENT)
Dept: LAB | Facility: HOSPITAL | Age: 70
End: 2020-12-05

## 2020-12-05 DIAGNOSIS — U07.1 COVID-19: ICD-10-CM

## 2020-12-05 PROCEDURE — C9803 HOPD COVID-19 SPEC COLLECT: HCPCS

## 2020-12-05 PROCEDURE — U0004 COV-19 TEST NON-CDC HGH THRU: HCPCS | Performed by: OBSTETRICS & GYNECOLOGY

## 2020-12-06 LAB — SARS-COV-2 RNA RESP QL NAA+PROBE: NOT DETECTED

## 2020-12-07 ENCOUNTER — ANESTHESIA EVENT (OUTPATIENT)
Dept: PERIOP | Facility: HOSPITAL | Age: 70
End: 2020-12-07

## 2020-12-08 ENCOUNTER — ANESTHESIA (OUTPATIENT)
Dept: PERIOP | Facility: HOSPITAL | Age: 70
End: 2020-12-08

## 2020-12-08 ENCOUNTER — HOSPITAL ENCOUNTER (OUTPATIENT)
Facility: HOSPITAL | Age: 70
Setting detail: HOSPITAL OUTPATIENT SURGERY
Discharge: HOME | End: 2020-12-08
Attending: INTERNAL MEDICINE | Admitting: INTERNAL MEDICINE

## 2020-12-08 VITALS
HEART RATE: 79 BPM | RESPIRATION RATE: 12 BRPM | TEMPERATURE: 97.5 F | SYSTOLIC BLOOD PRESSURE: 119 MMHG | WEIGHT: 169.2 LBS | HEIGHT: 68 IN | DIASTOLIC BLOOD PRESSURE: 91 MMHG | BODY MASS INDEX: 25.64 KG/M2 | OXYGEN SATURATION: 99 %

## 2020-12-08 DIAGNOSIS — Z12.11 ENCOUNTER FOR SCREENING FOR MALIGNANT NEOPLASM OF COLON: ICD-10-CM

## 2020-12-08 DIAGNOSIS — R19.7 DIARRHEA, UNSPECIFIED TYPE: ICD-10-CM

## 2020-12-08 DIAGNOSIS — Z86.010 PERSONAL HISTORY OF COLONIC POLYPS: ICD-10-CM

## 2020-12-08 DIAGNOSIS — R93.5 ABNORMAL CT OF THE ABDOMEN: ICD-10-CM

## 2020-12-08 PROCEDURE — 45385 COLONOSCOPY W/LESION REMOVAL: CPT | Performed by: INTERNAL MEDICINE

## 2020-12-08 PROCEDURE — 25010000002 PROPOFOL 10 MG/ML EMULSION: Performed by: NURSE ANESTHETIST, CERTIFIED REGISTERED

## 2020-12-08 PROCEDURE — 45380 COLONOSCOPY AND BIOPSY: CPT | Performed by: INTERNAL MEDICINE

## 2020-12-08 PROCEDURE — 43239 EGD BIOPSY SINGLE/MULTIPLE: CPT | Performed by: INTERNAL MEDICINE

## 2020-12-08 PROCEDURE — 88305 TISSUE EXAM BY PATHOLOGIST: CPT | Performed by: INTERNAL MEDICINE

## 2020-12-08 RX ORDER — LIDOCAINE HYDROCHLORIDE 20 MG/ML
INJECTION, SOLUTION INFILTRATION; PERINEURAL AS NEEDED
Status: DISCONTINUED | OUTPATIENT
Start: 2020-12-08 | End: 2020-12-08 | Stop reason: SURG

## 2020-12-08 RX ORDER — SODIUM CHLORIDE 0.9 % (FLUSH) 0.9 %
10 SYRINGE (ML) INJECTION EVERY 12 HOURS SCHEDULED
Status: DISCONTINUED | OUTPATIENT
Start: 2020-12-08 | End: 2020-12-08 | Stop reason: HOSPADM

## 2020-12-08 RX ORDER — PROPOFOL 10 MG/ML
VIAL (ML) INTRAVENOUS AS NEEDED
Status: DISCONTINUED | OUTPATIENT
Start: 2020-12-08 | End: 2020-12-08 | Stop reason: SURG

## 2020-12-08 RX ORDER — SODIUM CHLORIDE 0.9 % (FLUSH) 0.9 %
10 SYRINGE (ML) INJECTION AS NEEDED
Status: DISCONTINUED | OUTPATIENT
Start: 2020-12-08 | End: 2020-12-08 | Stop reason: HOSPADM

## 2020-12-08 RX ORDER — OMEPRAZOLE 40 MG/1
40 CAPSULE, DELAYED RELEASE ORAL DAILY
Qty: 90 CAPSULE | Refills: 3 | Status: SHIPPED | OUTPATIENT
Start: 2020-12-08 | End: 2021-02-03 | Stop reason: SDUPTHER

## 2020-12-08 RX ORDER — SODIUM CHLORIDE, SODIUM LACTATE, POTASSIUM CHLORIDE, CALCIUM CHLORIDE 600; 310; 30; 20 MG/100ML; MG/100ML; MG/100ML; MG/100ML
9 INJECTION, SOLUTION INTRAVENOUS CONTINUOUS
Status: DISCONTINUED | OUTPATIENT
Start: 2020-12-08 | End: 2020-12-08 | Stop reason: HOSPADM

## 2020-12-08 RX ORDER — SODIUM CHLORIDE 9 MG/ML
40 INJECTION, SOLUTION INTRAVENOUS AS NEEDED
Status: DISCONTINUED | OUTPATIENT
Start: 2020-12-08 | End: 2020-12-08 | Stop reason: HOSPADM

## 2020-12-08 RX ORDER — LIDOCAINE HYDROCHLORIDE 10 MG/ML
0.5 INJECTION, SOLUTION EPIDURAL; INFILTRATION; INTRACAUDAL; PERINEURAL ONCE AS NEEDED
Status: COMPLETED | OUTPATIENT
Start: 2020-12-08 | End: 2020-12-08

## 2020-12-08 RX ADMIN — PROPOFOL 100 MG: 10 INJECTION, EMULSION INTRAVENOUS at 13:33

## 2020-12-08 RX ADMIN — PROPOFOL 200 MCG/KG/MIN: 10 INJECTION, EMULSION INTRAVENOUS at 13:34

## 2020-12-08 RX ADMIN — LIDOCAINE HYDROCHLORIDE 0.5 ML: 10 INJECTION, SOLUTION EPIDURAL; INFILTRATION; INTRACAUDAL; PERINEURAL at 12:10

## 2020-12-08 RX ADMIN — LIDOCAINE HYDROCHLORIDE 100 MG: 20 INJECTION, SOLUTION INFILTRATION; PERINEURAL at 13:33

## 2020-12-08 RX ADMIN — SODIUM CHLORIDE, POTASSIUM CHLORIDE, SODIUM LACTATE AND CALCIUM CHLORIDE 9 ML/HR: 600; 310; 30; 20 INJECTION, SOLUTION INTRAVENOUS at 12:10

## 2020-12-08 NOTE — H&P
Patient Care Team:  Rashad Puente MD as PCP - General  Rashad Puente MD as PCP - Family Medicine    CHIEF COMPLAINT: Abnormal CT, Diarrhea    HISTORY OF PRESENT ILLNESS:  Last EGD colon was 2018 has had a good response to Colestid    Past Medical History:   Diagnosis Date   • Anxiety    • Arthritis    • GERD (gastroesophageal reflux disease)    • Hypertension      Past Surgical History:   Procedure Laterality Date   • ADENOIDECTOMY     • APPENDECTOMY     • BRONCHOSCOPY ENDOSCOPY     • CHOLECYSTECTOMY     • TONSILLECTOMY     • WISDOM TOOTH EXTRACTION       Family History   Problem Relation Age of Onset   • Colon cancer Neg Hx    • Colon polyps Neg Hx      Social History     Tobacco Use   • Smoking status: Former Smoker     Types: Cigarettes   • Smokeless tobacco: Never Used   Substance Use Topics   • Alcohol use: No   • Drug use: No     Medications Prior to Admission   Medication Sig Dispense Refill Last Dose   • ALPRAZolam (XANAX) 0.5 MG tablet Take 0.25-0.5 mg by mouth 3 (Three) Times a Day As Needed.   12/8/2020 at 1000   • amLODIPine (NORVASC) 2.5 MG tablet Take 2.5 mg by mouth Daily.   12/6/2020   • atenolol (TENORMIN) 100 MG tablet 50 mg.   12/6/2020   • colestipol (COLESTID) 1 g tablet Take 2 tablets by mouth every night at bedtime. If diarrhea improving continue with 2, if diarrhea not improving go up to 3. 180 tablet 3 More than a month at Unknown time   • famotidine (PEPCID) 20 MG tablet Take 1 tablet by mouth 2 (Two) Times a Day. 30 tablet 0 12/6/2020 at 2000   • levocetirizine (XYZAL) 5 MG tablet Take 5 mg by mouth Every Evening.   More than a month at Unknown time   • meclizine (ANTIVERT) 25 MG tablet Take 25 mg by mouth.   More than a month at Unknown time   • Multiple Vitamins-Minerals (CENTRUM SILVER PO) Take  by mouth.   12/5/2020   • ondansetron ODT (Zofran ODT) 8 MG disintegrating tablet Place 1 tablet on the tongue Every 8 (Eight) Hours As Needed for Nausea or Vomiting. 10 tablet 0 More than  "a month at Unknown time   • saccharomyces boulardii (FLORASTOR) 250 MG capsule Take 250 mg by mouth 2 (Two) Times a Day.   12/5/2020   • sucralfate (CARAFATE) 1 GM/10ML suspension Take 10 mL by mouth 4 (Four) Times a Day. 420 mL 0 12/6/2020 at 2000   • VITAMIN D PO Take  by mouth.   12/5/2020     Allergies:  Codeine, Methylprednisolone, Penicillins, Sulfa antibiotics, and Tetanus immune globulin    REVIEW OF SYSTEMS:  Please see the above history of present illness for pertinent positives and negatives.  The remainder of the patient's systems have been reviewed and are negative.     Vital Signs  Temp:  [97.5 °F (36.4 °C)] 97.5 °F (36.4 °C)  Heart Rate:  [108] 108  Resp:  [21] 21  BP: (112)/(82) 112/82    Flowsheet Rows      First Filed Value   Admission Height  172.7 cm (67.99\") Documented at 12/08/2020 1155   Admission Weight  76.7 kg (169 lb 3.2 oz) Documented at 12/08/2020 1155           Physical Exam:  Physical Exam   Constitutional: Patient appears well-developed and well-nourished and in no acute distress   HEENT:   Head: Normocephalic and atraumatic.   Eyes:  Pupils are equal, round, and reactive to light. EOM are intact. Sclerae are anicteric and non-injected.  Mouth and Throat: Patient has moist mucous membranes. Oropharynx is clear of any erythema or exudate.     Neck: Neck supple. No JVD present. No thyromegaly present. No lymphadenopathy present.  Cardiovascular: Regular rate, regular rhythm, S1 normal and S2 normal.  Exam reveals no gallop and no friction rub.  No murmur heard.  Pulmonary/Chest: Lungs are clear to auscultation bilaterally. No respiratory distress. No wheezes. No rhonchi. No rales.   Abdominal: Soft. Bowel sounds are normal. No distension and no mass. There is no hepatosplenomegaly. There is no tenderness.   Musculoskeletal: Normal Muscle tone  Extremities: No edema. Pulses are palpable in all 4 extremities.  Neurological: Patient is alert and oriented to person, place, and time. Cranial " nerves II-XII are grossly intact with no focal deficits.  Skin: Skin is warm. No rash noted. Nails show no clubbing.  No cyanosis or erythema.    Debilities/Disabilities Identified: None  Emotional Behavior: Appropriate     Results Review:    I reviewed the patient's new clinical results.  Lab Results (most recent)     None          Imaging Results (Most Recent)     None        reviewed    ECG/EMG Results (most recent)     None        reviewed    Assessment/Plan   Abnormal CT, Diarrhea/  EGD and colonoscopy      I discussed the patients findings and my recommendations with patient.     Aditya Dias MD  12/08/20  13:33 EST    Time: 10 min prior to procedure.

## 2020-12-08 NOTE — ANESTHESIA PREPROCEDURE EVALUATION
Anesthesia Evaluation     Patient summary reviewed and Nursing notes reviewed   NPO Solid Status: > 8 hours  NPO Liquid Status: > 8 hours           Airway   Mallampati: II  TM distance: >3 FB  Neck ROM: full  No difficulty expected  Dental    (+) upper dentures        Pulmonary     breath sounds clear to auscultation  (+) a smoker ( quit 1.5 years ago) Former,   Cardiovascular   Exercise tolerance: good (4-7 METS)    ECG reviewed  Rhythm: regular  Rate: normal    (+) hypertension well controlled 2 medications or greater,       Neuro/Psych  (+) psychiatric history Anxiety,     GI/Hepatic/Renal/Endo    (+)  GERD well controlled,  thyroid problem hypothyroidism    Musculoskeletal     Abdominal    Substance History      OB/GYN          Other   arthritis,      ROS/Med Hx Other: ECG 12 Lead  Order: [id number]  Status:  Final result   Visible to patient:  No (not released) Next appt:  None  Component   Ref Range & Units 10/30/20 1432 10/30/20 1143  QT Interval   ms 389  391     Narrative & Impression      HEART RATE= 67  bpm  RR Interval= 888  ms  PA Interval= 157  ms  P Horizontal Axis=   deg  P Front Axis= 58  deg  QRSD Interval= 100  ms  QT Interval= 389  ms  QRS Axis= 9  deg  T Wave Axis= 26  deg  - OTHERWISE NORMAL ECG -  Sinus rhythm  Ventricular premature complex  NO SIGNIFICANT CHANGE FROM PREVIOUS ECG  Electronically Signed By: Raymundo Ureña ([facility]) 31-Oct-2020 11:41:42  Date and Time of Study: 2020-10-30 14:32:16    Specimen Collected: 10/30/20 14:32                        Anesthesia Plan    ASA 2     MAC     intravenous induction     Anesthetic plan, all risks, benefits, and alternatives have been provided, discussed and informed consent has been obtained with: patient.  Use of blood products discussed with patient  Consented to blood products.   Plan discussed with CRNA.

## 2020-12-08 NOTE — BRIEF OP NOTE
ESOPHAGOGASTRODUODENOSCOPY, COLONOSCOPY  Progress Note    Tammy Montgomery  12/8/2020    Pre-op Diagnosis:   Diarrhea, unspecified type [R19.7]  Abnormal CT of the abdomen [R93.5]  Encounter for screening for malignant neoplasm of colon [Z12.11]  Personal history of colonic polyps [Z86.010]       Post-Op Diagnosis Codes:     * Diarrhea, unspecified type [R19.7]     * Abnormal CT of the abdomen [R93.5]     * Encounter for screening for malignant neoplasm of colon [Z12.11]     * Personal history of colonic polyps [Z86.010]     * Erosion of duodenum [K26.9]     * Erosive gastritis [K29.60]     * Ulcerative esophagitis [K22.10]     * Colon polyp [K63.5]     * Diverticulosis large intestine w/o perforation or abscess w/o bleeding [K57.30]    Procedure/CPT® Codes:        Procedure(s):  ESOPHAGOGASTRODUODENOSCOPY with biopsies   COLONOSCOPY, polypectomies, biopsies    Surgeon(s):  Aditya Dias MD    Anesthesia: Monitored Anesthesia Care    Staff:   Circulator: Lyndsey Chowdhury RN  Scrub Person: Taisha Wang         Estimated Blood Loss: none    Urine Voided: * No values recorded between 12/8/2020  1:28 PM and 12/8/2020  2:03 PM *    Specimens:                Specimens     ID Source Type Tests Collected By Collected At Frozen?      A Stomach Tissue · TISSUE PATHOLOGY EXAM   Aditya Dias MD 12/8/20 7488      This specimen was not marked as sent.    B Esophagus, Distal Tissue · TISSUE PATHOLOGY EXAM   Aditya Dias MD 12/8/20 6678      This specimen was not marked as sent.    C Small Intestine, Duodenum Tissue · TISSUE PATHOLOGY EXAM   Aditya Dias MD 12/8/20 8008      This specimen was not marked as sent.    D Large Intestine, Cecum Polyp · TISSUE PATHOLOGY EXAM   Aditya Dias MD 12/8/20 3408      Description: polyp x 2    This specimen was not marked as sent.    E Large Intestine, Right / Ascending Colon Tissue · TISSUE PATHOLOGY EXAM   Aditya Dias  MD Deshaun 12/8/20 1739      Description: biopsy    This specimen was not marked as sent.    F Large Intestine, Left / Descending Colon Tissue · TISSUE PATHOLOGY EXAM   ArunAditya MD 12/8/20 2126      Description: biopsy    This specimen was not marked as sent.    G Large Intestine, Rectum Polyp · TISSUE PATHOLOGY EXAM   ArunAditya MD 12/8/20 1401      Description: cold snare    This specimen was not marked as sent.                Drains: * No LDAs found *    Findings: Multiple Small Duodenal erosions-Biopsy  Erosive Gastritis-Biopsy  Ulcerative Esophagitis-Biopsy    Colon to TI good Prep  Polyps-3-Biopsy  R&L Colon Biopsy    Complications: None          Aditya Dias MD     Date: 12/8/2020  Time: 14:07 EST

## 2020-12-08 NOTE — OP NOTE
ESOPHAGOGASTRODUODENOSCOPY, COLONOSCOPY  Procedure Report    Patient Name:  Tammy Montgomery  YOB: 1950    Date of Surgery:  12/8/2020     Indications:  Diarrhea, unspecified type [R19.7]  Abnormal CT of the abdomen [R93.5]  Encounter for screening for malignant neoplasm of colon [Z12.11]  Personal history of colonic polyps [Z86.010]      Pre-op Diagnosis:   Diarrhea, unspecified type [R19.7]  Abnormal CT of the abdomen [R93.5]  Encounter for screening for malignant neoplasm of colon [Z12.11]  Personal history of colonic polyps [Z86.010]    Post-Op Diagnosis Codes:     * Diarrhea, unspecified type [R19.7]     * Abnormal CT of the abdomen [R93.5]     * Encounter for screening for malignant neoplasm of colon [Z12.11]     * Personal history of colonic polyps [Z86.010]     * Erosion of duodenum [K26.9]     * Erosive gastritis [K29.60]     * Ulcerative esophagitis [K22.10]     * Colon polyp [K63.5]     * Diverticulosis large intestine w/o perforation or abscess w/o bleeding [K57.30]         Procedure/CPT® Codes:      Procedure(s):  ESOPHAGOGASTRODUODENOSCOPY with biopsies   COLONOSCOPY, polypectomies, biopsies    Staff:  Surgeon(s):  Aditya Dias MD         Anesthesia: Monitored Anesthesia Care    Estimated Blood Loss: none    Specimens:   ID Type Source Tests Collected by Time   A (Not marked as sent) :  Tissue Stomach TISSUE PATHOLOGY EXAM Aditya Dias MD 12/8/2020 1338   B (Not marked as sent) :  Tissue Esophagus, Distal TISSUE PATHOLOGY EXAM Aditya Dias MD 12/8/2020 1338   C (Not marked as sent) :  Tissue Small Intestine, Duodenum TISSUE PATHOLOGY EXAM Aditya Dias MD 12/8/2020 1338   D (Not marked as sent) : polyp x 2 Polyp Large Intestine, Cecum TISSUE PATHOLOGY EXAM Aditya Dias MD 12/8/2020 1352   E (Not marked as sent) : biopsy Tissue Large Intestine, Right / Ascending Colon TISSUE PATHOLOGY EXAM Aditya Dias MD  12/8/2020 1356   F (Not marked as sent) : biopsy Tissue Large Intestine, Left / Descending Colon TISSUE PATHOLOGY EXAM ArunAditya narayanan MD 12/8/2020 1357   G (Not marked as sent) : cold snare Polyp Large Intestine, Rectum TISSUE PATHOLOGY EXAM Aditya Dias MD 12/8/2020 1401       Implants:    Nothing was implanted during the procedure      Description of Procedure: After having signed informed consent, she was brought to the endoscopy suite and placed in the left lateral decubitus position and given her IV sedation. A bite block was placed between her incisors. The scope was introduced into the oropharynx and advanced under direct visualization into the esophagus, through the distal esophagus. The Z-line was mildly irregular and there were active ulcers at the gastroesophageal junction. There were no strictures. The scope was advanced into the stomach and retroflexed revealing normal cardia and fundus. The scope was deretroflexed and advanced to the antrum. There were scattered areas of erythema and erosions from the body into the antrum. The scope was advanced through the pylorus to the duodenal bulb. There were multiple small 1-2 mm round duodenal erosions within the bulb. These extended to linear superficial erosions of the 2nd and 3rd portion of the duodenum. Biopsies were taken of these. The scope was withdrawn back into the antrum. Biopsies were taken to rule out H. pylori. The scope was withdrawn into the distal esophagus and biopsies were taken to rule out short segment Cochran's esophagus. This was not suspected visually. As the scope was withdrawn, the remainder of the esophagus was easily distended and normal appearing. The scope was taken from the patient, who tolerated that procedure well.     The patient was kept in the left lateral decubitus position and repositioned in the room. Rectal exam revealed no external lesions, normal anal tone, and no rectal mass. The scope was  introduced into the rectum and advanced under direct visualization through the rectum, sigmoid colon, past scattered small diverticula, to the descending colon, to and around the splenic flexure, reduced and advanced through the transverse colon with some looping, to and around the hepatic flexure. The scope was reduced into the ascending colon and then advanced into the cecum. The cecum was identified by the appendiceal orifice and the ileocecal valve. It was well prepped. The ileocecal valve was intubated and the terminal ileum was normal appearing. The scope was withdrawn back into the ascending colon and advanced into the cecum. In the periappendiceal orifice area was a diminutive 3-4 mm polyp that was biopsied and felt to be completely removed. There was a 2nd polyp of similar size of 4-5 mm that was on the ileocecal valve. This was biopsied. They were sent together as cecal polyps x2. The scope was withdrawn and no further mucosal lesions were noted throughout the ascending colon, hepatic flexure, or transverse colon. Biopsies were taken there randomly to rule out microscopic colitis. The scope was withdrawn around the splenic flexure through the descending colon into the sigmoid colon. Biopsies were taken of the sigmoid colon to rule out microscopic colitis. In the proximal rectum was a sessile 8 mm polyp noted. It was hyperplastic appearing. It was removed with cold snare technique and sent separately as rectal polyp. The scope was withdrawn into the rectum and retroflexed revealing an intact dentate line and no further mucosal lesions. The scope was deretroflexed and withdrawn. The patient tolerated both procedures very well.             Findings: Multiple Small Duodenal erosions-Biopsy  Erosive Gastritis-Biopsy  Ulcerative Esophagitis-Biopsy    Colon to TI good Prep  Polyps-3-Biopsy  R&L Colon Biopsy     Complications: None    Recommendations: Results to be called. and No aspirin or NSAIDS. Daily  PPI      Aditya Dias MD     Date: 12/8/2020  Time: 14:09 EST

## 2020-12-09 RX ORDER — SODIUM CHLORIDE, SODIUM LACTATE, POTASSIUM CHLORIDE, CALCIUM CHLORIDE 600; 310; 30; 20 MG/100ML; MG/100ML; MG/100ML; MG/100ML
100 INJECTION, SOLUTION INTRAVENOUS CONTINUOUS
Status: ACTIVE | OUTPATIENT
Start: 2020-12-09

## 2020-12-09 RX ORDER — ONDANSETRON 2 MG/ML
4 INJECTION INTRAMUSCULAR; INTRAVENOUS ONCE AS NEEDED
Status: ACTIVE | OUTPATIENT
Start: 2020-12-09

## 2020-12-10 LAB
CYTO UR: NORMAL
LAB AP CASE REPORT: NORMAL
PATH REPORT.FINAL DX SPEC: NORMAL
PATH REPORT.GROSS SPEC: NORMAL

## 2020-12-15 ENCOUNTER — TELEPHONE (OUTPATIENT)
Dept: SURGERY | Facility: CLINIC | Age: 70
End: 2020-12-15

## 2020-12-15 DIAGNOSIS — R19.7 DIARRHEA, UNSPECIFIED TYPE: Primary | ICD-10-CM

## 2020-12-15 NOTE — TELEPHONE ENCOUNTER
PT WOULD LIKE TO DISCUSS REFILLING HER COLESTIPOL--SHE WILL BE OUT OF IT BY NEXT Tuesday.  PLEASE CALL # [phone]

## 2020-12-16 RX ORDER — COLESTIPOL HYDROCHLORIDE 5 G/5G
5 GRANULE, FOR SUSPENSION ORAL DAILY
Qty: 30 PACKET | Refills: 11 | Status: SHIPPED | OUTPATIENT
Start: 2020-12-16 | End: 2021-05-05

## 2020-12-16 RX ORDER — CHOLESTYRAMINE 4 G/9G
4 POWDER, FOR SUSPENSION ORAL NIGHTLY
Qty: 378 G | Refills: 3 | Status: SHIPPED | OUTPATIENT
Start: 2020-12-16 | End: 2021-05-24

## 2020-12-17 NOTE — TELEPHONE ENCOUNTER
Please see if the pharmacy will give her the powder.  I want her to take either a 1/2 packet or 1/2 scoop every night at bedtime.  Thanks  I sent in a new prescription for the powder.

## 2021-01-13 ENCOUNTER — TELEPHONE (OUTPATIENT)
Dept: SURGERY | Facility: CLINIC | Age: 71
End: 2021-01-13

## 2021-02-03 ENCOUNTER — OFFICE VISIT (OUTPATIENT)
Dept: GASTROENTEROLOGY | Facility: CLINIC | Age: 71
End: 2021-02-03

## 2021-02-03 VITALS — TEMPERATURE: 98.2 F | HEIGHT: 68 IN | BODY MASS INDEX: 27.8 KG/M2 | WEIGHT: 183.4 LBS

## 2021-02-03 DIAGNOSIS — K57.30 SIGMOID DIVERTICULOSIS: Primary | ICD-10-CM

## 2021-02-03 DIAGNOSIS — K21.00 GASTROESOPHAGEAL REFLUX DISEASE WITH ESOPHAGITIS WITHOUT HEMORRHAGE: ICD-10-CM

## 2021-02-03 DIAGNOSIS — R14.2 BELCHING: ICD-10-CM

## 2021-02-03 DIAGNOSIS — K58.0 IRRITABLE BOWEL SYNDROME WITH DIARRHEA: ICD-10-CM

## 2021-02-03 DIAGNOSIS — K26.9 DUODENAL ULCER: ICD-10-CM

## 2021-02-03 PROCEDURE — 99214 OFFICE O/P EST MOD 30 MIN: CPT | Performed by: NURSE PRACTITIONER

## 2021-02-03 RX ORDER — LEVOTHYROXINE SODIUM 0.07 MG/1
75 TABLET ORAL DAILY
COMMUNITY
Start: 2020-12-10

## 2021-02-03 RX ORDER — OMEPRAZOLE 40 MG/1
40 CAPSULE, DELAYED RELEASE ORAL
Qty: 180 CAPSULE | Refills: 3 | Status: SHIPPED | OUTPATIENT
Start: 2021-02-03 | End: 2022-01-31

## 2021-02-03 NOTE — PATIENT INSTRUCTIONS
Discussed the importance of taking Omeprazole/Prilosec, 40mg twice daily before breakfast and dinner permanently due to known risk with long term acid reflux of Cochran's esophagus/esophageal cancer.     Continue on the Cholestyramine 1 scoop every evening (4gm) an hour before your bedtime medication.      Don't eat late, don't eat fried and don't eat too much at one time. Avoid tomato based products like pizza, lasagna, spagetti.  If you want to have these take an over the counter Pepcid immediately before you eat them.  Do not eat within 3-4 hrs of bedtime. Limit caffeine and carbonated beverages, if you must have them do not have later in the day.  If reflux is severe elevate the head of the bed. You may also use TUMS, maalox, or mylanta for breakthrough heartburn.    Take a daily probiotic (something with a billion and more different strains is better like probiotic 10 or probiotic gummies) for your gut as well.  AVOID taking NSAIDS (like ibuprofen, Aleve, Motrin, naproxen, meloxicam, etc) as much as possible and use acetaminophen (Tylenol) instead.    Drink lots of water, eat a high fiber diet with 25-30gm a day(check the fiber content in the foods you eat.  Protein bars with 15gm of fiber in each bar are a great supplement daily), and get regular exercise. Use over the counter simethicone xtra strength gas x (125-180mg) 2 twice a day as needed for gas.     High Fiber Food suggestions:    Beans, nuts, vegetables, whole grains, flax seed and genoveva seeds (which can be stirred into other food) are high in fiber.    Wu Protein bars from [employer] = 10gm of fiber in each bar (also gluten free)  Quest Protein bars from grocery stores= 15gm of fiber each (also gluten free)  Fiber One bars from grocery stores = 5-6gm of fiber each  Kelloggs Bran Buds cereal 1/2 cup = 17gm of fiber  K[employer]ger brand low sugar Craisins = 13gm of fiber per serving

## 2021-02-03 NOTE — PROGRESS NOTES
PATIENT INFORMATION  Tammy Montgomery       - 1950    CHIEF COMPLAINT  Chief Complaint   Patient presents with   • Follow-up     2 mo EGD w/o Cochran's       HISTORY OF PRESENT ILLNESS  She is feeling much better and has even put on 20lbs.  Now she wants to get on a healthier diet but she looks clinically more energetic and overall better at this visit.  She is still belching every day.      Cholestyramine works better for her than the colestid pills and she is not having any diarrhea.  Is normally having one bm a day now on the powder.  4gm a day.     20 EGD for Abn CT Duod biopsy Positive Duodenal Ulcers, gastritis, Reflux  Neg HP, Cochran's, Celiac, Colon 0/3 adenomas so recall in 5 years previous polyps, Diarrhe biopsy were normal  2018 colon Dr. Busby 2yr 4TA/SA  and sigmoid diverticulosis          REVIEWED PERTINENT RESULTS/ LABS  Lab Results   Component Value Date    CASEREPORT  2020     Surgical Pathology Report                         Case: [id number]                                  Authorizing Provider:  Aditya Dias        Collected:           2020 01:38 PM                                 MD Deshaun                                                                   Ordering Location:     [facility]   Received:            2020 04:36 PM                                 OR                                                                           Pathologist:           Rashad Quinn MD                                                         Specimens:   1) - Stomach                                                                                        2) - Esophagus, Distal                                                                              3) - Small Intestine, Duodenum                                                                      4) - Large Intestine, Cecum, polyp x 2                                                              5) - Large  Intestine, Right / Ascending Colon, biopsy                                               6) - Large Intestine, Left / Descending Colon, biopsy                                               7) - Large Intestine, Rectum, cold snare                                                   FINALDX  12/08/2020     1. Stomach, Biopsy: Benign gastric mucosa with    A. Mild chronic inflammation (mild non-specific chronic gastritis).   B. No intestinal metaplasia.   C. No Helicobacter pylori.     2. Esophagus, Distal, Biopsy: Benign squamous and gastric mucosa with    A. Mild chronic inflammation of the gastric mucosa.   B. No intestinal metaplasia.   C. No Helicobacter pylori.    3. Duodenum, Biopsy: Benign small bowel mucosa with    A. Ulceration and ulcerative debris.   B. Blunting of the villi.   C. Herniation of Brunner's glands in the lamina propria.   D. Foveolar metaplasia.    Comment: The features are those of a non-specific/peptic duodenitis with erosion and ulceration.     4. Cecum, Biopsy: Benign colonic mucosa with   A. No hyperplastic or tubulovillous change.   B. No significant inflammation.   C. No crypt distortion or basement membrane thickening.   D. No viral inclusions or other organisms on routinely stained sections.   E. Prominent lymphoid aggregates.    5. Colon, Right Ascending, Biopsy: Benign colonic mucosa with   A. No hyperplastic or tubulovillous change.   B. No significant inflammation.   C. No crypt distortion or basement membrane thickening.   D. No viral inclusions or other organisms on routinely stained sections.    6. Colon, Left Descending, Benign colonic mucosa with   A. No hyperplastic or tubulovillous change.   B. No significant inflammation.   C. No crypt distortion or basement membrane thickening.   D. No viral inclusions or other organisms on routinely stained sections.    7. Rectum, Biopsy: Benign colonic mucosa with   A. No hyperplastic or tubulovillous change.   B. No significant  inflammation.   C. No crypt distortion or basement membrane thickening.   D. No viral inclusions or other organisms on routinely stained sections.    gavi/pkm          Lab Results   Component Value Date    HGB 12.6 10/30/2020    MCV 91.0 10/30/2020     10/30/2020    ALT 15 10/30/2020    AST 23 10/30/2020    HGBA1C 4.5 07/19/2018    INR 1.0 03/07/2015    TRIG 46 05/18/2020    FERRITIN 90.9 01/09/2019    IRON 75 01/09/2019    TIBC 327 01/09/2019      No results found.    REVIEW OF SYSTEMS  Review of Systems   All other systems reviewed and are negative.        ACTIVE PROBLEMS  Patient Active Problem List    Diagnosis   • Gastroesophageal reflux disease with esophagitis without hemorrhage [K21.00]   • Duodenal ulcer [K26.9]   • Irritable bowel syndrome with diarrhea [K58.0]   • Belching [R14.2]   • Diarrhea [R19.7]   • Abnormal CT of the abdomen [R93.5]   • Encounter for screening for malignant neoplasm of colon [Z12.11]   • Sigmoid diverticulosis [K57.30]   • Personal history of colonic polyps [Z86.010]         PAST MEDICAL HISTORY  Past Medical History:   Diagnosis Date   • Anxiety    • Arthritis    • GERD (gastroesophageal reflux disease)    • Hypertension          SURGICAL HISTORY  Past Surgical History:   Procedure Laterality Date   • ADENOIDECTOMY     • APPENDECTOMY     • BRONCHOSCOPY ENDOSCOPY     • CHOLECYSTECTOMY     • COLONOSCOPY N/A 12/8/2020    Procedure: COLONOSCOPY, polypectomies, biopsies;  Surgeon: Aditya Dias MD;  Location: [facility] OR;  Service: Gastroenterology;  Laterality: N/A;  Diverticulosis  Cecal polyp x 2  Right colon biopsy  Left colon biopsy  Rectal polyp (cold snare)   • ENDOSCOPY N/A 12/8/2020    Procedure: ESOPHAGOGASTRODUODENOSCOPY with biopsies ;  Surgeon: Aditya Dias MD;  Location: [facility] OR;  Service: Gastroenterology;  Laterality: N/A;  Ulcerative esophagitis  Gastritis  Duodenal erosions  Duodenal biopsy  Gastric biopsy  Distal esophagus biopsy    • TONSILLECTOMY     • WISDOM TOOTH EXTRACTION           FAMILY HISTORY  Family History   Problem Relation Age of Onset   • Colon cancer Neg Hx    • Colon polyps Neg Hx          SOCIAL HISTORY  Social History     Occupational History   • Not on file   Tobacco Use   • Smoking status: Former Smoker     Types: Cigarettes   • Smokeless tobacco: Never Used   Substance and Sexual Activity   • Alcohol use: No   • Drug use: No   • Sexual activity: Yes     Partners: Male         CURRENT MEDICATIONS    Current Outpatient Medications:   •  ALPRAZolam (XANAX) 0.5 MG tablet, Take 0.25-0.5 mg by mouth 3 (Three) Times a Day As Needed., Disp: , Rfl:   •  amLODIPine (NORVASC) 2.5 MG tablet, Take 2.5 mg by mouth Daily., Disp: , Rfl:   •  atenolol (TENORMIN) 100 MG tablet, 50 mg., Disp: , Rfl:   •  cholestyramine (QUESTRAN) 4 GM/DOSE powder, Take 1 packet by mouth Every Night. mixed with a liquid, Disp: 378 g, Rfl: 3  •  colestipol (COLESTID) 1 g tablet, Take 2 tablets by mouth every night at bedtime. If diarrhea improving continue with 2, if diarrhea not improving go up to 3., Disp: 180 tablet, Rfl: 3  •  colestipol (COLESTID) 5 g packet, Take 1 packet by mouth Daily., Disp: 30 packet, Rfl: 11  •  famotidine (PEPCID) 20 MG tablet, Take 1 tablet by mouth 2 (Two) Times a Day., Disp: 30 tablet, Rfl: 0  •  levocetirizine (XYZAL) 5 MG tablet, Take 5 mg by mouth Every Evening., Disp: , Rfl:   •  levothyroxine (SYNTHROID, LEVOTHROID) 75 MCG tablet, Take 75 mcg by mouth Daily., Disp: , Rfl:   •  meclizine (ANTIVERT) 25 MG tablet, Take 25 mg by mouth., Disp: , Rfl:   •  Multiple Vitamins-Minerals (CENTRUM SILVER PO), Take  by mouth., Disp: , Rfl:   •  omeprazole (priLOSEC) 40 MG capsule, Take 1 capsule by mouth Daily With Breakfast & Dinner., Disp: 180 capsule, Rfl: 3  •  ondansetron ODT (Zofran ODT) 8 MG disintegrating tablet, Place 1 tablet on the tongue Every 8 (Eight) Hours As Needed for Nausea or Vomiting., Disp: 10 tablet, Rfl: 0  •   "saccharomyces boulardii (FLORASTOR) 250 MG capsule, Take 250 mg by mouth 2 (Two) Times a Day., Disp: , Rfl:   •  sucralfate (CARAFATE) 1 GM/10ML suspension, Take 10 mL by mouth 4 (Four) Times a Day., Disp: 420 mL, Rfl: 0  •  VITAMIN D PO, Take  by mouth., Disp: , Rfl:   No current facility-administered medications for this visit.     Facility-Administered Medications Ordered in Other Visits:   •  lactated ringers infusion, 100 mL/hr, Intravenous, Continuous, Chris Valadez CRNA  •  ondansetron (ZOFRAN) injection 4 mg, 4 mg, Intravenous, Once PRN, Chris Valadez CRNA    ALLERGIES  Codeine, Methylprednisolone, Penicillins, Sulfa antibiotics, and Tetanus immune globulin    VITALS  Vitals:    02/03/21 1252   Temp: 98.2 °F (36.8 °C)   TempSrc: Temporal   Weight: 83.2 kg (183 lb 6.4 oz)   Height: 172.7 cm (67.99\")       PHYSICAL EXAM  Debilities/Disabilities Identified: None  Emotional Behavior: Appropriate  Wt Readings from Last 3 Encounters:   02/03/21 83.2 kg (183 lb 6.4 oz)   12/08/20 76.7 kg (169 lb 3.2 oz)   12/02/20 78 kg (172 lb)     Ht Readings from Last 1 Encounters:   02/03/21 172.7 cm (67.99\")     Body mass index is 27.89 kg/m².  Physical Exam  Vitals signs and nursing note reviewed.   Constitutional:       Appearance: She is well-developed.   HENT:      Head: Normocephalic and atraumatic.   Eyes:      Conjunctiva/sclera: Conjunctivae normal.      Pupils: Pupils are equal, round, and reactive to light.   Neck:      Musculoskeletal: Normal range of motion and neck supple.   Cardiovascular:      Rate and Rhythm: Normal rate and regular rhythm.   Pulmonary:      Effort: Pulmonary effort is normal.      Breath sounds: Normal breath sounds.   Abdominal:      General: Bowel sounds are normal. There is no distension.      Palpations: Abdomen is soft.      Tenderness: There is abdominal tenderness in the right upper quadrant and epigastric area.   Musculoskeletal: Normal range of motion. "   Lymphadenopathy:      Cervical: No cervical adenopathy.   Skin:     General: Skin is warm and dry.   Neurological:      Mental Status: She is alert and oriented to person, place, and time.   Psychiatric:         Behavior: Behavior normal.         CLINICAL DATA REVIEWED   reviewed previous lab results and integrated with today's visit, reviewed notes from other physicians and/or last GI encounter, reviewed previous endoscopy results and available photos, reviewed surgical pathology results from previous biopsies    ASSESSMENT  Diagnoses and all orders for this visit:    Sigmoid diverticulosis    Duodenal ulcer    Gastroesophageal reflux disease with esophagitis without hemorrhage  -     omeprazole (priLOSEC) 40 MG capsule; Take 1 capsule by mouth Daily With Breakfast & Dinner.    Irritable bowel syndrome with diarrhea    Belching  -     omeprazole (priLOSEC) 40 MG capsule; Take 1 capsule by mouth Daily With Breakfast & Dinner.    Other orders  -     levothyroxine (SYNTHROID, LEVOTHROID) 75 MCG tablet; Take 75 mcg by mouth Daily.          PLAN  Return in about 3 months (around 5/3/2021).     Discussed the importance of taking Omeprazole/Prilosec, 40mg twice daily before breakfast and dinner permanently due to known risk with long term acid reflux of Cochran's esophagus/esophageal cancer.     Continue on the Cholestyramine 1 scoop every evening (4gm) an hour before your bedtime medication.      Don't eat late, don't eat fried and don't eat too much at one time. Avoid tomato based products like pizza, lasagna, spagetti.  If you want to have these take an over the counter Pepcid immediately before you eat them.  Do not eat within 3-4 hrs of bedtime. Limit caffeine and carbonated beverages, if you must have them do not have later in the day.  If reflux is severe elevate the head of the bed. You may also use TUMS, maalox, or mylanta for breakthrough heartburn.    Take a daily probiotic (something with a billion and more  different strains is better like probiotic 10 or probiotic gummies) for your gut as well.  AVOID taking NSAIDS (like ibuprofen, Aleve, Motrin, naproxen, meloxicam, etc) as much as possible and use acetaminophen (Tylenol) instead.    Drink lots of water, eat a high fiber diet with 25-30gm a day(check the fiber content in the foods you eat.  Protein bars with 15gm of fiber in each bar are a great supplement daily), and get regular exercise. Use over the counter simethicone xtra strength gas x (125-180mg) 2 twice a day as needed for gas.     High Fiber Food suggestions:    Beans, nuts, vegetables, whole grains, flax seed and genoveva seeds (which can be stirred into other food) are high in fiber.    Wu Protein bars from [employer] = 10gm of fiber in each bar (also gluten free)  Quest Protein bars from grocery stores= 15gm of fiber each (also gluten free)  Fiber One bars from grocery stores = 5-6gm of fiber each  Kelloggs Bran Buds cereal 1/2 cup = 17gm of fiber  Kroger brand low sugar Craisins = 13gm of fiber per serving        I have discussed the above plan with the patient.  They verbalize understanding and are in agreement with the plan.  They have been advised to contact the office for any questions, concerns, or changes related to their health.

## 2021-02-24 ENCOUNTER — TELEPHONE (OUTPATIENT)
Dept: GASTROENTEROLOGY | Facility: CLINIC | Age: 71
End: 2021-02-24

## 2021-02-24 NOTE — TELEPHONE ENCOUNTER
Call from patient.  She states went to bathroom 4 times today.  First time was formed, but the last couple are no diarrhea look like pile of manure.  She is taking powder at night time and taking omeprazole as required.  Worried about the not formed stools.  Please call.

## 2021-02-25 NOTE — TELEPHONE ENCOUNTER
Called and notify patient.  She states it only happen once, which was yesterday.  If happens again, will do the two scoops.

## 2021-02-25 NOTE — TELEPHONE ENCOUNTER
Please instruct her to start taking the cholestyramine twice a day.  One scoop mid morning and one scoop at bedtime and see how she does over the next 2 weeks.

## 2021-02-26 ENCOUNTER — TELEPHONE (OUTPATIENT)
Dept: GASTROENTEROLOGY | Facility: CLINIC | Age: 71
End: 2021-02-26

## 2021-05-05 ENCOUNTER — LAB (OUTPATIENT)
Dept: LAB | Facility: HOSPITAL | Age: 71
End: 2021-05-05

## 2021-05-05 ENCOUNTER — OFFICE VISIT (OUTPATIENT)
Dept: GASTROENTEROLOGY | Facility: CLINIC | Age: 71
End: 2021-05-05

## 2021-05-05 VITALS — HEIGHT: 68 IN | TEMPERATURE: 98.6 F | WEIGHT: 180.8 LBS | BODY MASS INDEX: 27.4 KG/M2

## 2021-05-05 DIAGNOSIS — K58.0 IRRITABLE BOWEL SYNDROME WITH DIARRHEA: ICD-10-CM

## 2021-05-05 DIAGNOSIS — K57.30 SIGMOID DIVERTICULOSIS: Primary | ICD-10-CM

## 2021-05-05 DIAGNOSIS — R74.8 ELEVATED ALKALINE PHOSPHATASE LEVEL: ICD-10-CM

## 2021-05-05 DIAGNOSIS — K26.9 DUODENAL ULCER: ICD-10-CM

## 2021-05-05 DIAGNOSIS — K21.00 GASTROESOPHAGEAL REFLUX DISEASE WITH ESOPHAGITIS WITHOUT HEMORRHAGE: ICD-10-CM

## 2021-05-05 LAB — GGT SERPL-CCNC: 82 U/L (ref 5–36)

## 2021-05-05 PROCEDURE — 36415 COLL VENOUS BLD VENIPUNCTURE: CPT

## 2021-05-05 PROCEDURE — 99213 OFFICE O/P EST LOW 20 MIN: CPT | Performed by: NURSE PRACTITIONER

## 2021-05-05 PROCEDURE — 82977 ASSAY OF GGT: CPT

## 2021-05-05 NOTE — PATIENT INSTRUCTIONS
"Alkphos slightly elevated so will GGT today and call results.  If normal recommend routine dexa scan.     Discussed the importance of taking Omeprazole/Prilosec, 40mg twice daily before breakfast and dinner permanently due to known risk with long term acid reflux of Cochran's esophagus/esophageal cancer.      Continue on the Cholestyramine 1 scoop every evening (4gm) an hour before your bedtime medication.       Low Acid Diet Instructions:   Don't eat late, don't eat fried or spicy, and don't eat too much at one time. Avoid alcohol and  tomato based products like pizza, lasagna, spaghetti.  If you want to have these take an over the counter Pepcid or TUMS immediately before you eat them.  Do not eat within 3-4 hrs of bedtime. Limit caffeine and carbonated beverages, if you must have them do not have later in the day.  If reflux is severe elevate the head of the bed. You may also use TUMS, maalox, or mylanta for breakthrough heartburn.    Take a daily probiotic (something with a billion cultures and more different strains is better, examples like \"Probiotic 10\" or probiotic gummies) for your gut as well.  AVOID taking NSAIDS (like ibuprofen, Aleve, Motrin, naproxen, meloxicam, etc) as much as possible and use acetaminophen (Tylenol) instead.    Drink lots of water, eat a high fiber diet with 25-30gm a day(check the fiber content in the foods you eat.  Protein bars with 15gm of fiber in each bar are a great supplement daily), and get regular exercise. Use over the counter simethicone xtra strength gas x (125-180mg) 2 twice a day as needed for gas.     High Fiber Food suggestions:  Beans, nuts, vegetables, whole grains, flax seed and genoveva seeds (which can be stirred into other food) are high in fiber.    Wu Protein bars from [employer] = 10-15gm of fiber in each bar (also gluten free)  Quest Protein bars from grocery stores [employer], [employer], [employer]= 15gm of fiber each (also gluten free)  Fiber One bars from [employer] " stores = 5-6gm of fiber each  Kelloggs Bran Buds cereal 1/2 cup = 17gm of fiber  Kroger brand low sugar Craisins = 13gm of fiber per serving  Melalueca Fiberwise powder=15gm fiber per scoop  CarbBalance tortillas= 15gm of fiber each  Natural Ovens Keto-Friendly Bread found at [facility]=12gm fiber per serving  No [place]w vegan bar at [facility]=15gm fiber per serving

## 2021-05-05 NOTE — PROGRESS NOTES
PATIENT INFORMATION  Tammy Montgomery       - 1950    CHIEF COMPLAINT  Chief Complaint   Patient presents with   • Follow-up     3 mo follow up on Gerd; IBS-D; Ulcer       HISTORY OF PRESENT ILLNESS  Feb visit:  She is feeling much better and has even put on 20lbs.  Now she wants to get on a healthier diet but she looks clinically more energetic and overall better at this visit.  She is still belching every day.       Cholestyramine works better for her than the colestid pills and she is not having any diarrhea.  Is normally having one bm a day now on the powder.  4gm a day.      20 EGD for Abn CT Duod biopsy Positive Duodenal Ulcers, gastritis, Reflux  Neg HP, Cochran's, Celiac, Colon 0/3 adenomas so recall in 5 years previous polyps, Diarrhe biopsy were normal   colon Dr. Busby 2yr 4TA/SA  and sigmoid diverticulosis    increase toOmeprazole/Prilosec, 40mg twice daily before breakfast and dinner permanently due to known risk with long term acid reflux of Cochrna's esophagus/esophageal cancer.     Continue on the Cholestyramine 1 scoop every evening (4gm) an hour before your bedtime medication.      Today:    bm every morning at 8am and 1-3 a day soft and formed on cholestyramine 4gm packet at bedtime.  She is doing much better and no HB and belching is usually only if she eats a big meal.          REVIEWED PERTINENT RESULTS/ LABS  Lab Results   Component Value Date    CASEREPORT  2020     Surgical Pathology Report                         Case: [id number]                                  Authorizing Provider:  Aditya Dias        Collected:           2020 01:38 PM                                 MD Deshaun                                                                   Ordering Location:     [facility]   Received:            2020 04:36 PM                                 OR                                                                           Pathologist:            Rashad Quinn MD                                                         Specimens:   1) - Stomach                                                                                        2) - Esophagus, Distal                                                                              3) - Small Intestine, Duodenum                                                                      4) - Large Intestine, Cecum, polyp x 2                                                              5) - Large Intestine, Right / Ascending Colon, biopsy                                               6) - Large Intestine, Left / Descending Colon, biopsy                                               7) - Large Intestine, Rectum, cold snare                                                   FINALDX  12/08/2020     1. Stomach, Biopsy: Benign gastric mucosa with    A. Mild chronic inflammation (mild non-specific chronic gastritis).   B. No intestinal metaplasia.   C. No Helicobacter pylori.     2. Esophagus, Distal, Biopsy: Benign squamous and gastric mucosa with    A. Mild chronic inflammation of the gastric mucosa.   B. No intestinal metaplasia.   C. No Helicobacter pylori.    3. Duodenum, Biopsy: Benign small bowel mucosa with    A. Ulceration and ulcerative debris.   B. Blunting of the villi.   C. Herniation of Brunner's glands in the lamina propria.   D. Foveolar metaplasia.    Comment: The features are those of a non-specific/peptic duodenitis with erosion and ulceration.     4. Cecum, Biopsy: Benign colonic mucosa with   A. No hyperplastic or tubulovillous change.   B. No significant inflammation.   C. No crypt distortion or basement membrane thickening.   D. No viral inclusions or other organisms on routinely stained sections.   E. Prominent lymphoid aggregates.    5. Colon, Right Ascending, Biopsy: Benign colonic mucosa with   A. No hyperplastic or tubulovillous change.   B. No significant inflammation.   C. No crypt  distortion or basement membrane thickening.   D. No viral inclusions or other organisms on routinely stained sections.    6. Colon, Left Descending, Benign colonic mucosa with   A. No hyperplastic or tubulovillous change.   B. No significant inflammation.   C. No crypt distortion or basement membrane thickening.   D. No viral inclusions or other organisms on routinely stained sections.    7. Rectum, Biopsy: Benign colonic mucosa with   A. No hyperplastic or tubulovillous change.   B. No significant inflammation.   C. No crypt distortion or basement membrane thickening.   D. No viral inclusions or other organisms on routinely stained sections.    gavi/pkm          Lab Results   Component Value Date    HGB 12.8 04/30/2021    MCV 94.2 04/30/2021     04/30/2021    ALT 27 04/30/2021    AST 30 04/30/2021    HGBA1C 4.5 07/19/2018    INR 1.0 03/07/2015    TRIG 46 05/18/2020    FERRITIN 90.9 01/09/2019    IRON 75 01/09/2019    TIBC 327 01/09/2019      No results found.    REVIEW OF SYSTEMS  Review of Systems   All other systems reviewed and are negative.        ACTIVE PROBLEMS  Patient Active Problem List    Diagnosis    • Gastroesophageal reflux disease with esophagitis without hemorrhage [K21.00]    • Duodenal ulcer [K26.9]    • Irritable bowel syndrome with diarrhea [K58.0]    • Belching [R14.2]    • Diarrhea [R19.7]    • Abnormal CT of the abdomen [R93.5]    • Encounter for screening for malignant neoplasm of colon [Z12.11]    • Sigmoid diverticulosis [K57.30]    • Personal history of colonic polyps [Z86.010]          PAST MEDICAL HISTORY  Past Medical History:   Diagnosis Date   • Anxiety    • Arthritis    • GERD (gastroesophageal reflux disease)    • Hypertension          SURGICAL HISTORY  Past Surgical History:   Procedure Laterality Date   • ADENOIDECTOMY     • APPENDECTOMY     • BRONCHOSCOPY ENDOSCOPY     • CHOLECYSTECTOMY     • COLONOSCOPY N/A 12/8/2020    Procedure: COLONOSCOPY, polypectomies, biopsies;   Surgeon: Aditya Dias MD;  Location:  [facility];  Service: Gastroenterology;  Laterality: N/A;  Diverticulosis  Cecal polyp x 2  Right colon biopsy  Left colon biopsy  Rectal polyp (cold snare)   • ENDOSCOPY N/A 12/8/2020    Procedure: ESOPHAGOGASTRODUODENOSCOPY with biopsies ;  Surgeon: Aditya Dias MD;  Location:  [facility];  Service: Gastroenterology;  Laterality: N/A;  Ulcerative esophagitis  Gastritis  Duodenal erosions  Duodenal biopsy  Gastric biopsy  Distal esophagus biopsy   • TONSILLECTOMY     • WISDOM TOOTH EXTRACTION           FAMILY HISTORY  Family History   Problem Relation Age of Onset   • Colon cancer Neg Hx    • Colon polyps Neg Hx          SOCIAL HISTORY  Social History     Occupational History   • Not on file   Tobacco Use   • Smoking status: Former Smoker     Types: Cigarettes   • Smokeless tobacco: Never Used   Vaping Use   • Vaping Use: Never used   Substance and Sexual Activity   • Alcohol use: No   • Drug use: No   • Sexual activity: Yes     Partners: Male         CURRENT MEDICATIONS    Current Outpatient Medications:   •  ALPRAZolam (XANAX) 0.5 MG tablet, Take 0.25-0.5 mg by mouth 3 (Three) Times a Day As Needed., Disp: , Rfl:   •  amLODIPine (NORVASC) 2.5 MG tablet, Take 2.5 mg by mouth Daily., Disp: , Rfl:   •  atenolol (TENORMIN) 100 MG tablet, 50 mg., Disp: , Rfl:   •  cholestyramine (QUESTRAN) 4 GM/DOSE powder, Take 1 packet by mouth Every Night. mixed with a liquid, Disp: 378 g, Rfl: 3  •  levocetirizine (XYZAL) 5 MG tablet, Take 5 mg by mouth Every Evening., Disp: , Rfl:   •  levothyroxine (SYNTHROID, LEVOTHROID) 75 MCG tablet, Take 75 mcg by mouth Daily., Disp: , Rfl:   •  meclizine (ANTIVERT) 25 MG tablet, Take 25 mg by mouth., Disp: , Rfl:   •  Multiple Vitamins-Minerals (CENTRUM SILVER PO), Take  by mouth., Disp: , Rfl:   •  omeprazole (priLOSEC) 40 MG capsule, Take 1 capsule by mouth Daily With Breakfast & Dinner., Disp: 180 capsule, Rfl: 3  •   "saccharomyces boulardii (FLORASTOR) 250 MG capsule, Take 250 mg by mouth 2 (Two) Times a Day., Disp: , Rfl:   •  VITAMIN D PO, Take  by mouth., Disp: , Rfl:   •  ondansetron ODT (Zofran ODT) 8 MG disintegrating tablet, Place 1 tablet on the tongue Every 8 (Eight) Hours As Needed for Nausea or Vomiting., Disp: 10 tablet, Rfl: 0  No current facility-administered medications for this visit.    Facility-Administered Medications Ordered in Other Visits:   •  lactated ringers infusion, 100 mL/hr, Intravenous, Continuous, Chris Valadez CRNA  •  ondansetron (ZOFRAN) injection 4 mg, 4 mg, Intravenous, Once PRN, Chris Valadez CRNA    ALLERGIES  Codeine, Methylprednisolone, Penicillins, Sulfa antibiotics, and Tetanus immune globulin    VITALS  Vitals:    05/05/21 1307   Temp: 98.6 °F (37 °C)   TempSrc: Temporal   Weight: 82 kg (180 lb 12.8 oz)   Height: 172.7 cm (67.99\")       PHYSICAL EXAM  Debilities/Disabilities Identified: None  Emotional Behavior: Appropriate  Wt Readings from Last 3 Encounters:   05/05/21 82 kg (180 lb 12.8 oz)   02/03/21 83.2 kg (183 lb 6.4 oz)   12/08/20 76.7 kg (169 lb 3.2 oz)     Ht Readings from Last 1 Encounters:   05/05/21 172.7 cm (67.99\")     Body mass index is 27.5 kg/m².  Physical Exam  Vitals and nursing note reviewed.   Constitutional:       Appearance: She is well-developed.   HENT:      Head: Normocephalic and atraumatic.   Eyes:      Conjunctiva/sclera: Conjunctivae normal.      Pupils: Pupils are equal, round, and reactive to light.   Cardiovascular:      Rate and Rhythm: Normal rate and regular rhythm.   Pulmonary:      Effort: Pulmonary effort is normal.      Breath sounds: Normal breath sounds.   Abdominal:      General: Bowel sounds are normal. There is no distension.      Palpations: Abdomen is soft.   Musculoskeletal:         General: Normal range of motion.      Cervical back: Normal range of motion and neck supple.   Lymphadenopathy:      Cervical: No cervical " "adenopathy.   Skin:     General: Skin is warm and dry.   Neurological:      Mental Status: She is alert and oriented to person, place, and time.   Psychiatric:         Behavior: Behavior normal.         CLINICAL DATA REVIEWED   reviewed previous lab results and integrated with today's visit, reviewed notes from other physicians and/or last GI encounter, reviewed previous endoscopy results and available photos, reviewed surgical pathology results from previous biopsies    ASSESSMENT  Diagnoses and all orders for this visit:    Sigmoid diverticulosis    Duodenal ulcer    Irritable bowel syndrome with diarrhea    Gastroesophageal reflux disease with esophagitis without hemorrhage    Elevated alkaline phosphatase level  -     Gamma GT; Future          PLAN  Return in about 6 months (around 11/5/2021).     Alkphos slightly elevated so will GGT today and call results.  If normal recommend routine dexa scan.     Discussed the importance of taking Omeprazole/Prilosec, 40mg twice daily before breakfast and dinner permanently due to known risk with long term acid reflux of Cochran's esophagus/esophageal cancer.      Continue on the Cholestyramine 1 scoop every evening (4gm) an hour before your bedtime medication.       Low Acid Diet Instructions:   Don't eat late, don't eat fried or spicy, and don't eat too much at one time. Avoid alcohol and  tomato based products like pizza, lasagna, spaghetti.  If you want to have these take an over the counter Pepcid or TUMS immediately before you eat them.  Do not eat within 3-4 hrs of bedtime. Limit caffeine and carbonated beverages, if you must have them do not have later in the day.  If reflux is severe elevate the head of the bed. You may also use TUMS, maalox, or mylanta for breakthrough heartburn.    Take a daily probiotic (something with a billion cultures and more different strains is better, examples like \"Probiotic 10\" or probiotic gummies) for your gut as well.  AVOID taking " NSAIDS (like ibuprofen, Aleve, Motrin, naproxen, meloxicam, etc) as much as possible and use acetaminophen (Tylenol) instead.    Drink lots of water, eat a high fiber diet with 25-30gm a day(check the fiber content in the foods you eat.  Protein bars with 15gm of fiber in each bar are a great supplement daily), and get regular exercise. Use over the counter simethicone xtra strength gas x (125-180mg) 2 twice a day as needed for gas.     High Fiber Food suggestions:  Beans, nuts, vegetables, whole grains, flax seed and genoveva seeds (which can be stirred into other food) are high in fiber.    Wu Protein bars from [employer] = 10-15gm of fiber in each bar (also gluten free)  Quest Protein bars from grocery stores [employer], [employer], [employer]= 15gm of fiber each (also gluten free)  Fiber One bars from grocery stores = 5-6gm of fiber each  Kelloggs Bran Buds cereal 1/2 cup = 17gm of fiber  Kroger brand low sugar Craisins = 13gm of fiber per serving  Melalueca Fiberwise powder=15gm fiber per scoop  CarbBalance tortillas= 15gm of fiber each  Natural Ovens Keto-Friendly Bread found at [employer]=12gm fiber per serving  No Getachew vegan bar at [employer]=15gm fiber per serving    I have discussed the above plan with the patient.  They verbalize understanding and are in agreement with the plan.  They have been advised to contact the office for any questions, concerns, or changes related to their health.

## 2021-05-07 ENCOUNTER — TELEPHONE (OUTPATIENT)
Dept: GASTROENTEROLOGY | Facility: CLINIC | Age: 71
End: 2021-05-07

## 2021-05-10 ENCOUNTER — TELEPHONE (OUTPATIENT)
Dept: GASTROENTEROLOGY | Facility: CLINIC | Age: 71
End: 2021-05-10

## 2021-05-24 RX ORDER — CHOLESTYRAMINE 4 G/9G
POWDER, FOR SUSPENSION ORAL
Qty: 378 G | Refills: 3 | Status: SHIPPED | OUTPATIENT
Start: 2021-05-24 | End: 2021-06-01

## 2021-05-27 ENCOUNTER — TELEPHONE (OUTPATIENT)
Dept: SURGERY | Facility: CLINIC | Age: 71
End: 2021-05-27

## 2021-06-01 RX ORDER — CHOLESTYRAMINE 4 G/9G
POWDER, FOR SUSPENSION ORAL
Qty: 378 G | Refills: 3 | Status: SHIPPED | OUTPATIENT
Start: 2021-06-01 | End: 2022-03-09

## 2021-07-02 ENCOUNTER — TELEPHONE (OUTPATIENT)
Dept: GASTROENTEROLOGY | Facility: CLINIC | Age: 71
End: 2021-07-02

## 2021-07-02 NOTE — TELEPHONE ENCOUNTER
PT HAS BEEN EATING APPLES AND CASHEWS HER BMS HAVE BEEN NORMAL FOR THE MOST PART BUT TODAY SHE HAD A LITTLE BIT OF DIARRHEA.    WANTS TO KNOW IF ITS OK TO CONTINUE EATING THE CASHEWS AND APPLES EVEN THOUGH SHE THINKS THEY MAY GIVING HER A LITTLE BIT OF DIARRHEA.

## 2021-07-02 NOTE — TELEPHONE ENCOUNTER
Please let her know that a little diarrhea is fine.  She may take an over the counter immodium (antidiarrhea) pill as needed if it is keeping her from being able to leave the house.

## 2021-10-05 ENCOUNTER — LAB (OUTPATIENT)
Dept: LAB | Facility: HOSPITAL | Age: 71
End: 2021-10-05

## 2021-10-05 ENCOUNTER — TRANSCRIBE ORDERS (OUTPATIENT)
Dept: ADMINISTRATIVE | Facility: HOSPITAL | Age: 71
End: 2021-10-05

## 2021-10-05 DIAGNOSIS — Z79.891 ENCOUNTER FOR LONG-TERM METHADONE USE: ICD-10-CM

## 2021-10-05 DIAGNOSIS — R53.83 TIREDNESS: ICD-10-CM

## 2021-10-05 DIAGNOSIS — R21 RASH AND OTHER NONSPECIFIC SKIN ERUPTION: ICD-10-CM

## 2021-10-05 DIAGNOSIS — Z79.891 ENCOUNTER FOR LONG-TERM METHADONE USE: Primary | ICD-10-CM

## 2021-10-05 LAB
ALBUMIN SERPL-MCNC: 4.5 G/DL (ref 3.5–5.2)
ALBUMIN/GLOB SERPL: 1.5 G/DL
ALP SERPL-CCNC: 82 U/L (ref 39–117)
ALT SERPL W P-5'-P-CCNC: 18 U/L (ref 1–33)
ANION GAP SERPL CALCULATED.3IONS-SCNC: 8.3 MMOL/L (ref 5–15)
AST SERPL-CCNC: 22 U/L (ref 1–32)
BACTERIA UR QL AUTO: NORMAL /HPF
BASOPHILS # BLD AUTO: 0.05 10*3/MM3 (ref 0–0.2)
BASOPHILS NFR BLD AUTO: 0.6 % (ref 0–1.5)
BILIRUB SERPL-MCNC: 0.3 MG/DL (ref 0–1.2)
BILIRUB UR QL STRIP: NEGATIVE
BUN SERPL-MCNC: 21 MG/DL (ref 8–23)
BUN/CREAT SERPL: 31.3 (ref 7–25)
CALCIUM SPEC-SCNC: 9.5 MG/DL (ref 8.6–10.5)
CHLORIDE SERPL-SCNC: 91 MMOL/L (ref 98–107)
CLARITY UR: CLEAR
CO2 SERPL-SCNC: 26.7 MMOL/L (ref 22–29)
COLOR UR: YELLOW
CREAT SERPL-MCNC: 0.67 MG/DL (ref 0.57–1)
DEPRECATED RDW RBC AUTO: 36.3 FL (ref 37–54)
EOSINOPHIL # BLD AUTO: 0.46 10*3/MM3 (ref 0–0.4)
EOSINOPHIL NFR BLD AUTO: 5.4 % (ref 0.3–6.2)
ERYTHROCYTE [DISTWIDTH] IN BLOOD BY AUTOMATED COUNT: 10.9 % (ref 12.3–15.4)
ERYTHROCYTE [SEDIMENTATION RATE] IN BLOOD: 20 MM/HR (ref 0–30)
GFR SERPL CREATININE-BSD FRML MDRD: 86 ML/MIN/1.73
GLOBULIN UR ELPH-MCNC: 3 GM/DL
GLUCOSE SERPL-MCNC: 98 MG/DL (ref 65–99)
GLUCOSE UR STRIP-MCNC: NEGATIVE MG/DL
HAV IGM SERPL QL IA: NORMAL
HBV CORE IGM SERPL QL IA: NORMAL
HBV SURFACE AG SERPL QL IA: NORMAL
HCT VFR BLD AUTO: 37.5 % (ref 34–46.6)
HCV AB SER DONR QL: NORMAL
HGB BLD-MCNC: 12.9 G/DL (ref 12–15.9)
HGB UR QL STRIP.AUTO: ABNORMAL
HYALINE CASTS UR QL AUTO: NORMAL /LPF
IMM GRANULOCYTES # BLD AUTO: 0.03 10*3/MM3 (ref 0–0.05)
IMM GRANULOCYTES NFR BLD AUTO: 0.4 % (ref 0–0.5)
KETONES UR QL STRIP: NEGATIVE
LEUKOCYTE ESTERASE UR QL STRIP.AUTO: NEGATIVE
LYMPHOCYTES # BLD AUTO: 1.83 10*3/MM3 (ref 0.7–3.1)
LYMPHOCYTES NFR BLD AUTO: 21.5 % (ref 19.6–45.3)
MCH RBC QN AUTO: 31.3 PG (ref 26.6–33)
MCHC RBC AUTO-ENTMCNC: 34.4 G/DL (ref 31.5–35.7)
MCV RBC AUTO: 91 FL (ref 79–97)
MONOCYTES # BLD AUTO: 0.58 10*3/MM3 (ref 0.1–0.9)
MONOCYTES NFR BLD AUTO: 6.8 % (ref 5–12)
NEUTROPHILS NFR BLD AUTO: 5.58 10*3/MM3 (ref 1.7–7)
NEUTROPHILS NFR BLD AUTO: 65.3 % (ref 42.7–76)
NITRITE UR QL STRIP: NEGATIVE
NRBC BLD AUTO-RTO: 0 /100 WBC (ref 0–0.2)
PH UR STRIP.AUTO: 6.5 [PH] (ref 5–8)
PLATELET # BLD AUTO: 281 10*3/MM3 (ref 140–450)
PMV BLD AUTO: 9 FL (ref 6–12)
POTASSIUM SERPL-SCNC: 3.7 MMOL/L (ref 3.5–5.2)
PROT SERPL-MCNC: 7.5 G/DL (ref 6–8.5)
PROT UR QL STRIP: NEGATIVE
RBC # BLD AUTO: 4.12 10*6/MM3 (ref 3.77–5.28)
RBC # UR: NORMAL /HPF
REF LAB TEST METHOD: NORMAL
SODIUM SERPL-SCNC: 126 MMOL/L (ref 136–145)
SP GR UR STRIP: <=1.005 (ref 1–1.03)
SQUAMOUS #/AREA URNS HPF: NORMAL /HPF
UROBILINOGEN UR QL STRIP: ABNORMAL
WBC # BLD AUTO: 8.53 10*3/MM3 (ref 3.4–10.8)
WBC UR QL AUTO: NORMAL /HPF

## 2021-10-05 PROCEDURE — 80053 COMPREHEN METABOLIC PANEL: CPT

## 2021-10-05 PROCEDURE — 83520 IMMUNOASSAY QUANT NOS NONAB: CPT

## 2021-10-05 PROCEDURE — 86256 FLUORESCENT ANTIBODY TITER: CPT

## 2021-10-05 PROCEDURE — 36415 COLL VENOUS BLD VENIPUNCTURE: CPT

## 2021-10-05 PROCEDURE — 85652 RBC SED RATE AUTOMATED: CPT

## 2021-10-05 PROCEDURE — 86147 CARDIOLIPIN ANTIBODY EA IG: CPT

## 2021-10-05 PROCEDURE — 81001 URINALYSIS AUTO W/SCOPE: CPT

## 2021-10-05 PROCEDURE — 80074 ACUTE HEPATITIS PANEL: CPT

## 2021-10-05 PROCEDURE — 85025 COMPLETE CBC W/AUTO DIFF WBC: CPT

## 2021-10-06 LAB
CARDIOLIPIN IGG SER IA-ACNC: 13 GPL U/ML (ref 0–14)
CARDIOLIPIN IGM SER IA-ACNC: 24 MPL U/ML (ref 0–12)

## 2021-10-07 ENCOUNTER — TELEPHONE (OUTPATIENT)
Dept: GASTROENTEROLOGY | Facility: CLINIC | Age: 71
End: 2021-10-07

## 2021-10-07 LAB
C-ANCA TITR SER IF: NORMAL TITER
MYELOPEROXIDASE AB SER IA-ACNC: <9 U/ML (ref 0–9)
P-ANCA ATYPICAL TITR SER IF: NORMAL TITER
P-ANCA TITR SER IF: NORMAL TITER
PROTEINASE3 AB SER IA-ACNC: <3.5 U/ML (ref 0–3.5)

## 2021-10-07 NOTE — TELEPHONE ENCOUNTER
PT CALLED AND STATED THAT SHE IS EXPERIENCING A CHANGE IN BM SHE IS HAVING TO STRAIN A LITTLE MORE THAN BEFORE. WANTS TO KNOW IF IT IS NORMAL.

## 2021-10-07 NOTE — TELEPHONE ENCOUNTER
Pt concern stool shaped changed. Not constipated but concern shape changed. Pt fluid intake decrease due to fluid restriction. Encourage to follow MR recommendation for miralax encourage to start with half scoop daily.

## 2021-11-11 ENCOUNTER — TELEPHONE (OUTPATIENT)
Dept: GASTROENTEROLOGY | Facility: CLINIC | Age: 71
End: 2021-11-11

## 2021-11-11 NOTE — TELEPHONE ENCOUNTER
Patient has been on cholestyramine nightly for a year now and just wants to know if this is okay. She is not having any problems.

## 2022-01-30 DIAGNOSIS — K21.00 GASTROESOPHAGEAL REFLUX DISEASE WITH ESOPHAGITIS WITHOUT HEMORRHAGE: ICD-10-CM

## 2022-01-30 DIAGNOSIS — R14.2 BELCHING: ICD-10-CM

## 2022-01-31 RX ORDER — OMEPRAZOLE 40 MG/1
CAPSULE, DELAYED RELEASE ORAL
Qty: 180 CAPSULE | Refills: 3 | Status: SHIPPED | OUTPATIENT
Start: 2022-01-31 | End: 2023-01-16

## 2022-03-09 RX ORDER — CHOLESTYRAMINE 4 G/9G
POWDER, FOR SUSPENSION ORAL
Qty: 378 G | Refills: 0 | Status: SHIPPED | OUTPATIENT
Start: 2022-03-09 | End: 2022-04-22

## 2022-04-06 ENCOUNTER — TELEPHONE (OUTPATIENT)
Dept: GASTROENTEROLOGY | Facility: CLINIC | Age: 72
End: 2022-04-06

## 2022-04-06 NOTE — TELEPHONE ENCOUNTER
PT CALLED WANTING TO KNOW IF SHE NEEDS TO CONTINUE TAKING OMEPRAZOLE SHE HAS BEEN TAKING FOR OVER A YEAR NOW, SHE READ WHERE SHE SHOULDN'T TAKE FOR OVER THREE MONTHS.

## 2022-04-22 RX ORDER — CHOLESTYRAMINE 4 G/9G
POWDER, FOR SUSPENSION ORAL
Qty: 378 G | Refills: 0 | Status: SHIPPED | OUTPATIENT
Start: 2022-04-22 | End: 2022-05-26

## 2022-04-22 NOTE — TELEPHONE ENCOUNTER
Patient called checking in this, was told needs to be authorized.  Last dose will be  Sunday night.  Please call.

## 2022-04-22 NOTE — TELEPHONE ENCOUNTER
Spoke with Olivia at [facility] pharmacy NO PA needed she just spoke with pt informed medication ready to .

## 2022-04-26 ENCOUNTER — TELEPHONE (OUTPATIENT)
Dept: GASTROENTEROLOGY | Facility: CLINIC | Age: 72
End: 2022-04-26

## 2022-04-26 NOTE — TELEPHONE ENCOUNTER
DISCUSSED WITH RN PER EDIN PT TO INCREASE FLUIDS NO CHANGES TO RX.  PT PLACED ON SCHEDULE FOR Monday MAY 2 PT PASSED DUE FOR APPT FU IBS.  CALLED PT CONFIRMED APPT MAY 2.

## 2022-04-26 NOTE — TELEPHONE ENCOUNTER
PT CALLED STATED SHE I SHAVING SOME IRREGULAR BMS, IS FEELING LIKE SHE HAS TO STRAIN TO GO TO THE BATHROOM. WILL HAVE A BM IN THE MORNING AND THEN MAYBE AN HOUR LATER HAVE TO GO AGAIN. HER BM IS NOT SOFT. MORE HARD LIKE.  WANTS TO KNOW IF SHE SHOULD STILL CONTINUE TO TAKE THE cholestyramine.

## 2022-05-02 ENCOUNTER — OFFICE VISIT (OUTPATIENT)
Dept: GASTROENTEROLOGY | Facility: CLINIC | Age: 72
End: 2022-05-02

## 2022-05-02 VITALS
DIASTOLIC BLOOD PRESSURE: 78 MMHG | WEIGHT: 169 LBS | BODY MASS INDEX: 25.61 KG/M2 | SYSTOLIC BLOOD PRESSURE: 110 MMHG | HEIGHT: 68 IN

## 2022-05-02 DIAGNOSIS — K57.30 SIGMOID DIVERTICULOSIS: ICD-10-CM

## 2022-05-02 DIAGNOSIS — Z86.010 PERSONAL HISTORY OF COLONIC POLYPS: ICD-10-CM

## 2022-05-02 DIAGNOSIS — K58.0 IRRITABLE BOWEL SYNDROME WITH DIARRHEA: Primary | ICD-10-CM

## 2022-05-02 DIAGNOSIS — K21.00 GASTROESOPHAGEAL REFLUX DISEASE WITH ESOPHAGITIS WITHOUT HEMORRHAGE: ICD-10-CM

## 2022-05-02 PROCEDURE — 99213 OFFICE O/P EST LOW 20 MIN: CPT | Performed by: INTERNAL MEDICINE

## 2022-05-02 RX ORDER — FLUTICASONE PROPIONATE 50 MCG
2 SPRAY, SUSPENSION (ML) NASAL DAILY
COMMUNITY
Start: 2021-12-27

## 2022-05-02 NOTE — PROGRESS NOTES
"    PATIENT INFORMATION  Tammy Montgomery       - 1950    CHIEF COMPLAINT  Chief Complaint   Patient presents with   • Constipation   • Heartburn          • Irritable Bowel Syndrome   • DUODENAL ULCER       HISTORY OF PRESENT ILLNESS  Had scopes done in 2020 and just three weeks ago started with mild constipation with difficulty passing an dthen has to go again to finish, and is concerned about \"causing Hemorrhoids\"    Here for mild constipation that is different from her IBS-D that has been evaluated recently and is on a high fiber diet and no supplements but is on a probiotric      REVIEWED PERTINENT RESULTS/ LABS  Lab Results   Component Value Date    CASEREPORT  2020     Surgical Pathology Report                         Case: [id number]                                  Authorizing Provider:  Aditya Dias        Collected:           2020 01:38 PM                                 MD Deshaun                                                                   Ordering Location:     [facility]   Received:            2020 04:36 PM                                 OR                                                                           Pathologist:           Rashad Quinn MD                                                         Specimens:   1) - Stomach                                                                                        2) - Esophagus, Distal                                                                              3) - Small Intestine, Duodenum                                                                      4) - Large Intestine, Cecum, polyp x 2                                                              5) - Large Intestine, Right / Ascending Colon, biopsy                                               6) - Large Intestine, Left / Descending Colon, biopsy                                               7) - Large Intestine, Rectum, cold snare  "                                                  FINALDX  12/08/2020     1. Stomach, Biopsy: Benign gastric mucosa with    A. Mild chronic inflammation (mild non-specific chronic gastritis).   B. No intestinal metaplasia.   C. No Helicobacter pylori.     2. Esophagus, Distal, Biopsy: Benign squamous and gastric mucosa with    A. Mild chronic inflammation of the gastric mucosa.   B. No intestinal metaplasia.   C. No Helicobacter pylori.    3. Duodenum, Biopsy: Benign small bowel mucosa with    A. Ulceration and ulcerative debris.   B. Blunting of the villi.   C. Herniation of Brunner's glands in the lamina propria.   D. Foveolar metaplasia.    Comment: The features are those of a non-specific/peptic duodenitis with erosion and ulceration.     4. Cecum, Biopsy: Benign colonic mucosa with   A. No hyperplastic or tubulovillous change.   B. No significant inflammation.   C. No crypt distortion or basement membrane thickening.   D. No viral inclusions or other organisms on routinely stained sections.   E. Prominent lymphoid aggregates.    5. Colon, Right Ascending, Biopsy: Benign colonic mucosa with   A. No hyperplastic or tubulovillous change.   B. No significant inflammation.   C. No crypt distortion or basement membrane thickening.   D. No viral inclusions or other organisms on routinely stained sections.    6. Colon, Left Descending, Benign colonic mucosa with   A. No hyperplastic or tubulovillous change.   B. No significant inflammation.   C. No crypt distortion or basement membrane thickening.   D. No viral inclusions or other organisms on routinely stained sections.    7. Rectum, Biopsy: Benign colonic mucosa with   A. No hyperplastic or tubulovillous change.   B. No significant inflammation.   C. No crypt distortion or basement membrane thickening.   D. No viral inclusions or other organisms on routinely stained sections.    gavi/pkm          Lab Results   Component Value Date    HGB 12.9 10/05/2021    MCV 91.0  10/05/2021     10/05/2021    ALT 20 03/10/2022    AST 28 03/10/2022    HGBA1C 4.5 07/19/2018    INR 1.0 03/07/2015    TRIG 46 05/18/2020    FERRITIN 90.9 01/09/2019    IRON 75 01/09/2019    TIBC 327 01/09/2019      No results found.    REVIEW OF SYSTEMS  Review of Systems   Constitutional: Negative for activity change, chills, fever and unexpected weight change.   HENT: Negative for congestion.    Eyes: Negative for visual disturbance.   Respiratory: Negative for shortness of breath.    Cardiovascular: Negative for chest pain and palpitations.   Gastrointestinal: Positive for abdominal distention, abdominal pain and constipation. Negative for blood in stool.   Endocrine: Negative for cold intolerance and heat intolerance.   Genitourinary: Negative for hematuria.   Musculoskeletal: Negative for gait problem.   Skin: Negative for color change.   Allergic/Immunologic: Negative for immunocompromised state.   Neurological: Negative for weakness and light-headedness.   Hematological: Negative for adenopathy.   Psychiatric/Behavioral: Negative for sleep disturbance. The patient is not nervous/anxious.          ACTIVE PROBLEMS  Patient Active Problem List    Diagnosis    • Gastroesophageal reflux disease with esophagitis without hemorrhage [K21.00]    • Duodenal ulcer [K26.9]    • Irritable bowel syndrome with diarrhea [K58.0]    • Belching [R14.2]    • Diarrhea [R19.7]    • Abnormal CT of the abdomen [R93.5]    • Encounter for screening for malignant neoplasm of colon [Z12.11]    • Sigmoid diverticulosis [K57.30]    • Personal history of colonic polyps [Z86.010]          PAST MEDICAL HISTORY  Past Medical History:   Diagnosis Date   • Anxiety    • Arthritis    • GERD (gastroesophageal reflux disease)    • Hypertension          SURGICAL HISTORY  Past Surgical History:   Procedure Laterality Date   • ADENOIDECTOMY     • APPENDECTOMY     • BRONCHOSCOPY ENDOSCOPY     • CHOLECYSTECTOMY     • COLONOSCOPY N/A 12/8/2020     Procedure: COLONOSCOPY, polypectomies, biopsies;  Surgeon: Aditya Dias MD;  Location:  [facility];  Service: Gastroenterology;  Laterality: N/A;  Diverticulosis  Cecal polyp x 2  Right colon biopsy  Left colon biopsy  Rectal polyp (cold snare)   • ENDOSCOPY N/A 12/8/2020    Procedure: ESOPHAGOGASTRODUODENOSCOPY with biopsies ;  Surgeon: Aditya Dias MD;  Location:  [facility];  Service: Gastroenterology;  Laterality: N/A;  Ulcerative esophagitis  Gastritis  Duodenal erosions  Duodenal biopsy  Gastric biopsy  Distal esophagus biopsy   • TONSILLECTOMY     • WISDOM TOOTH EXTRACTION           FAMILY HISTORY  Family History   Problem Relation Age of Onset   • Colon cancer Neg Hx    • Colon polyps Neg Hx          SOCIAL HISTORY  Social History     Occupational History   • Not on file   Tobacco Use   • Smoking status: Former Smoker     Types: Cigarettes   • Smokeless tobacco: Never Used   Vaping Use   • Vaping Use: Never used   Substance and Sexual Activity   • Alcohol use: No   • Drug use: No   • Sexual activity: Yes     Partners: Male         CURRENT MEDICATIONS    Current Outpatient Medications:   •  ALPRAZolam (XANAX) 0.5 MG tablet, Take 0.25-0.5 mg by mouth 3 (Three) Times a Day As Needed., Disp: , Rfl:   •  amLODIPine (NORVASC) 2.5 MG tablet, Take 2.5 mg by mouth Daily., Disp: , Rfl:   •  atenolol (TENORMIN) 100 MG tablet, 50 mg., Disp: , Rfl:   •  cholestyramine (QUESTRAN) 4 GM/DOSE powder, DISSOLVE 1 SCOOP OF POWDER IN LIQUID AND DRINK BY MOUTH NIGHTLY, Disp: 378 g, Rfl: 0  •  fluticasone (FLONASE) 50 MCG/ACT nasal spray, 2 sprays by Alternating Nares route Daily., Disp: , Rfl:   •  levocetirizine (XYZAL) 5 MG tablet, Take 5 mg by mouth Every Evening., Disp: , Rfl:   •  levothyroxine (SYNTHROID, LEVOTHROID) 75 MCG tablet, Take 75 mcg by mouth Daily., Disp: , Rfl:   •  Multiple Vitamins-Minerals (CENTRUM SILVER PO), Take  by mouth., Disp: , Rfl:   •  omeprazole (priLOSEC) 40 MG capsule,  "TAKE 1 CAPSULE BY MOUTH ONCE DAILY WITH BREAKFAST AND WITH SUPPER, Disp: 180 capsule, Rfl: 3  •  saccharomyces boulardii (FLORASTOR) 250 MG capsule, Take 250 mg by mouth 2 (Two) Times a Day., Disp: , Rfl:   •  VITAMIN D PO, Take  by mouth., Disp: , Rfl:   •  hydrocortisone 2.5 % ointment, Apply 1 application topically to the appropriate area as directed 2 (Two) Times a Day., Disp: 30 g, Rfl: 11  No current facility-administered medications for this visit.    Facility-Administered Medications Ordered in Other Visits:   •  lactated ringers infusion, 100 mL/hr, Intravenous, Continuous, Chris Valadez CRNA  •  ondansetron (ZOFRAN) injection 4 mg, 4 mg, Intravenous, Once PRN, Chris Valadez CRNA    ALLERGIES  Codeine, Methylprednisolone, Penicillins, Sulfa antibiotics, and Tetanus immune globulin    VITALS  Vitals:    05/02/22 1624   BP: 110/78   BP Location: Left arm   Patient Position: Sitting   Cuff Size: Large Adult   Weight: 76.7 kg (169 lb)   Height: 172.7 cm (68\")       PHYSICAL EXAM  Debilities/Disabilities Identified: None  Emotional Behavior: Appropriate  Wt Readings from Last 3 Encounters:   05/02/22 76.7 kg (169 lb)   05/05/21 82 kg (180 lb 12.8 oz)   02/03/21 83.2 kg (183 lb 6.4 oz)     Ht Readings from Last 1 Encounters:   05/02/22 172.7 cm (68\")     Body mass index is 25.7 kg/m².  Physical Exam  Constitutional:       Appearance: She is well-developed. She is not diaphoretic.   HENT:      Head: Normocephalic and atraumatic.   Eyes:      General: No scleral icterus.     Conjunctiva/sclera: Conjunctivae normal.      Pupils: Pupils are equal, round, and reactive to light.   Neck:      Thyroid: No thyromegaly.   Cardiovascular:      Rate and Rhythm: Normal rate and regular rhythm.      Heart sounds: Normal heart sounds. No murmur heard.    No gallop.   Pulmonary:      Effort: Pulmonary effort is normal.      Breath sounds: Normal breath sounds. No wheezing or rales.   Abdominal:      General: " Bowel sounds are normal. There is no distension or abdominal bruit.      Palpations: Abdomen is soft. There is no shifting dullness, fluid wave or mass.      Tenderness: There is no abdominal tenderness. There is no guarding. Negative signs include Rodriguez's sign.      Hernia: There is no hernia in the ventral area.   Musculoskeletal:         General: Normal range of motion.      Cervical back: Normal range of motion and neck supple.   Lymphadenopathy:      Cervical: No cervical adenopathy.   Skin:     General: Skin is warm and dry.      Findings: No erythema or rash.   Neurological:      Mental Status: She is alert and oriented to person, place, and time.   Psychiatric:         Mood and Affect: Mood normal.         Behavior: Behavior normal.         CLINICAL DATA REVIEWED   reviewed previous lab results and integrated with today's visit, reviewed notes from other physicians and/or last GI encounter, reviewed previous endoscopy results and available photos, reviewed surgical pathology results from previous biopsies    ASSESSMENT  Diagnoses and all orders for this visit:    Irritable bowel syndrome with diarrhea    Gastroesophageal reflux disease with esophagitis without hemorrhage    Personal history of colonic polyps    Sigmoid diverticulosis    Other orders  -     fluticasone (FLONASE) 50 MCG/ACT nasal spray; 2 sprays by Alternating Nares route Daily.  -     hydrocortisone 2.5 % ointment; Apply 1 application topically to the appropriate area as directed 2 (Two) Times a Day.          PLAN    Return in about 6 months (around 11/2/2022).    I have discussed the above plan with the patient.  They verbalize understanding and are in agreement with the plan.  They have been advised to contact the office for any questions, concerns, or changes related to their health.

## 2022-05-03 ENCOUNTER — TELEPHONE (OUTPATIENT)
Dept: GASTROENTEROLOGY | Facility: CLINIC | Age: 72
End: 2022-05-03

## 2022-05-03 NOTE — TELEPHONE ENCOUNTER
PT CALLED WANTED TO KNOW WHEN SHE SHOULD TAKE THE BENEFIBER.  SHE ALSO WANTS TO KNOW  IF SHE IS ABLE TO INCORPORATE RED SAUCE - LIKE PIZZA, CHILI, SPAGHETTI BACK INTO HER DIET AGAIN AS BRIAN TOLD HER NOT TO EAT THESE ITEMS, AND SHE HASN'T ATE IN OVER A YEAR.

## 2022-05-05 NOTE — TELEPHONE ENCOUNTER
PT CALLED STATED SHE WENT AHEAD AND STARTED TAKING BENEFIBER.  BUT SHE JUST WANTS TO KNOW IF SHE CAN HAVE A SLICE OF PIZZA OR CHILI.

## 2022-05-06 NOTE — TELEPHONE ENCOUNTER
Spoke with patient.  Advised her that the acidic foods she is inquiring about may cause significant discomfort as per Dr. Dias's note below.  Advised patient to limit her weekly intake of these things and that she may want to chew a Tums prior to eating these things.  Patient voiced understanding.

## 2022-05-26 RX ORDER — CHOLESTYRAMINE 4 G/9G
POWDER, FOR SUSPENSION ORAL
Qty: 378 G | Refills: 11 | Status: SHIPPED | OUTPATIENT
Start: 2022-05-26

## 2022-06-07 ENCOUNTER — TELEPHONE (OUTPATIENT)
Dept: GASTROENTEROLOGY | Facility: CLINIC | Age: 72
End: 2022-06-07

## 2022-06-07 NOTE — TELEPHONE ENCOUNTER
Spoke with patient.  Advised her that the bulk from the fiber will likely increase bowel movement frequency, especially since she is s/p cholecystectomy, eats protein bars, and eats daily oatmeal for breakfast.  She denies any diarrhea or bloody stool.  BMs are normal consistency.  She feels fine except for the gas.  I advised increasing fiber intake may cause increased gas.  Suggested she try using Gas X to see if this helps.  Furthermore, patient advises that she has had some spicy, Mexican meals lately.  I advised that these would contribute to the gas and BMs.  Suggested that she avoid these meals when possible.  Patient voiced understanding of all.

## 2022-06-07 NOTE — TELEPHONE ENCOUNTER
PT CALLED WANTS TO KNOW IF IT IS NORMAL WHEN TAKING THE BENEFIBER TO HAVE 3-4 BMS A DAY SHE STATED SHE USED TO ONLY HAVE ONE A DAY BUT NOW IS HAVING 3 -4 AND GAS.

## 2022-07-13 ENCOUNTER — TELEPHONE (OUTPATIENT)
Dept: GASTROENTEROLOGY | Facility: CLINIC | Age: 72
End: 2022-07-13

## 2022-07-13 NOTE — TELEPHONE ENCOUNTER
Pt called cancel the July 20th appt   Pt was seen in may and 6 month follow up is in November.    Pt doing okay

## 2022-08-19 ENCOUNTER — TELEPHONE (OUTPATIENT)
Dept: GASTROENTEROLOGY | Facility: CLINIC | Age: 72
End: 2022-08-19

## 2022-08-19 NOTE — TELEPHONE ENCOUNTER
Pt called to clarify if it would be ok to take promethazine with cholestyramine.     PER TIANNA katz - this is ok

## 2022-08-24 ENCOUNTER — TELEPHONE (OUTPATIENT)
Dept: GASTROENTEROLOGY | Facility: CLINIC | Age: 72
End: 2022-08-24

## 2022-09-22 ENCOUNTER — TELEPHONE (OUTPATIENT)
Dept: GASTROENTEROLOGY | Facility: CLINIC | Age: 72
End: 2022-09-22

## 2022-10-05 ENCOUNTER — TELEPHONE (OUTPATIENT)
Dept: GASTROENTEROLOGY | Facility: CLINIC | Age: 72
End: 2022-10-05

## 2022-10-05 NOTE — TELEPHONE ENCOUNTER
"PT CALLED STATED SHE HAD A BIT OF ANXIETY YESTERDAY FROM HAVING BLOOD WORK TODAY SHE HAD 3 BM ALL NORMAL BUT WAS CONCERNED DUE TO THE LAST BM BEING \"STICKY CONSISTINECY\".  WANTS TO KNOW IF THIS IS NORMAL.   "

## 2022-10-19 ENCOUNTER — TELEPHONE (OUTPATIENT)
Dept: GASTROENTEROLOGY | Facility: CLINIC | Age: 72
End: 2022-10-19

## 2022-10-19 NOTE — TELEPHONE ENCOUNTER
"Spoke with patient.  Actually, she just started the \"intermittent fasting\" diet, is walking several miles a day, eating lots of fruits/vegetables, and taking daily fiber.  However, she is eating daily yogurt and taking probiotic.  She feels great, but just worried about the new onset diarrhea which corresponds to the new diet.  I advised her to continue her current diet, exercise, and meds.  And advised that the yogurt and probiotic should help to balance her gut Ph.  Since it has only been 2 days on the new diet, the diarrhea should ary on it's own.  If it continues and/or she develops bloody diarrhea, fever, or any other new gastric symptom, I advised her to call back before the weekend.  "

## 2022-10-19 NOTE — TELEPHONE ENCOUNTER
PT CALLED STATED YESTERDAY SHE HAD DIARRHEA ONE TIME AND THEN AGAIN TODAY.  THIS IS NEW FOR HER SHE STATED AND IS CONCERNED. NO BLOOD IN STOOL     UPCOMING APPT WITH DR JARVIS 11/17/22   DECLINED APPT WITH MELISSA AT THIS TIME.

## 2022-11-15 NOTE — ANESTHESIA POSTPROCEDURE EVALUATION
Patient: Tammy Montgomery    Procedure Summary     Date: 12/08/20 Room / Location: [facility] ENDOSCOPY 1 /  [facility]    Anesthesia Start: 1330 Anesthesia Stop: 1406    Procedures:       ESOPHAGOGASTRODUODENOSCOPY with biopsies  (N/A Esophagus)      COLONOSCOPY, polypectomies, biopsies (N/A ) Diagnosis:       Diarrhea, unspecified type      Abnormal CT of the abdomen      Encounter for screening for malignant neoplasm of colon      Personal history of colonic polyps      Erosion of duodenum      Erosive gastritis      Ulcerative esophagitis      Colon polyp      Diverticulosis large intestine w/o perforation or abscess w/o bleeding      (Diarrhea, unspecified type [R19.7])      (Abnormal CT of the abdomen [R93.5])      (Encounter for screening for malignant neoplasm of colon [Z12.11])      (Personal history of colonic polyps [Z86.010])    Surgeon: Aditya Dias MD Provider: Chris Valadez CRNA    Anesthesia Type: MAC ASA Status: 2          Anesthesia Type: MAC    Vitals  No vitals data found for the desired time range.          Post Anesthesia Care and Evaluation    Patient location during evaluation: PHASE II  Patient participation: complete - patient participated  Level of consciousness: awake  Pain score: 0  Pain management: adequate  Airway patency: patent  Anesthetic complications: No anesthetic complications  PONV Status: none  Cardiovascular status: acceptable  Respiratory status: acceptable  Hydration status: acceptable       15-Nov-2022 10:19

## 2022-11-17 ENCOUNTER — TELEPHONE (OUTPATIENT)
Dept: GASTROENTEROLOGY | Facility: CLINIC | Age: 72
End: 2022-11-17

## 2022-11-19 NOTE — TELEPHONE ENCOUNTER
Lab Results   Component Value Date    HGBA1C 7 3 (H) 09/17/2022       Recent Labs     11/18/22  1644 11/19/22  0109 11/19/22  0616 11/19/22  1122   POCGLU 148* 183* 186* 170*     -SSI, adjust as needed     - Trulicity added  Blood Sugar Average: Last 72 hrs:  (P) 535 [id number] PT HAS QUESTIONS ABOUT HER COLESTIPOL AND SOME OTHER ISSUES SHE WOULDN'T MENTION.  PLEASE CALL # [phone]

## 2022-12-02 ENCOUNTER — TELEPHONE (OUTPATIENT)
Dept: GASTROENTEROLOGY | Facility: CLINIC | Age: 72
End: 2022-12-02

## 2022-12-02 NOTE — TELEPHONE ENCOUNTER
PT CALLED STATED THAT SINCE TAKING BENEFIBER SHE HAS ONE NORMAL STOOL AND THEN SHE WILL HAVE ANOTHER BM AND IT WILL BE A SKINNY THIN STOOL.  WANTS TO KNOW IF THIS IS NORMAL

## 2023-01-05 ENCOUNTER — TELEPHONE (OUTPATIENT)
Dept: GASTROENTEROLOGY | Facility: CLINIC | Age: 73
End: 2023-01-05
Payer: MEDICARE

## 2023-01-06 NOTE — TELEPHONE ENCOUNTER
Talked to patient today and bowel movements improved, thinks it is new creamer she has tried. Did not have many bowel movements, but did not think with cholestyramine she was supposed to have any diarrhea and it scared her. Back to normal bowel pattern today. Discussed that IBS can still flare with medication. Will see her in office in 2 weeks.

## 2023-01-16 DIAGNOSIS — K21.00 GASTROESOPHAGEAL REFLUX DISEASE WITH ESOPHAGITIS WITHOUT HEMORRHAGE: ICD-10-CM

## 2023-01-16 DIAGNOSIS — R14.2 BELCHING: ICD-10-CM

## 2023-01-16 RX ORDER — OMEPRAZOLE 40 MG/1
CAPSULE, DELAYED RELEASE ORAL
Qty: 180 CAPSULE | Refills: 0 | Status: SHIPPED | OUTPATIENT
Start: 2023-01-16

## 2023-01-19 ENCOUNTER — OFFICE VISIT (OUTPATIENT)
Dept: GASTROENTEROLOGY | Facility: CLINIC | Age: 73
End: 2023-01-19
Payer: MEDICARE

## 2023-01-19 VITALS
HEIGHT: 68 IN | DIASTOLIC BLOOD PRESSURE: 78 MMHG | BODY MASS INDEX: 25.82 KG/M2 | SYSTOLIC BLOOD PRESSURE: 126 MMHG | WEIGHT: 170.4 LBS

## 2023-01-19 DIAGNOSIS — K58.0 IRRITABLE BOWEL SYNDROME WITH DIARRHEA: Primary | ICD-10-CM

## 2023-01-19 DIAGNOSIS — Z86.010 PERSONAL HISTORY OF COLONIC POLYPS: ICD-10-CM

## 2023-01-19 DIAGNOSIS — K21.00 GASTROESOPHAGEAL REFLUX DISEASE WITH ESOPHAGITIS WITHOUT HEMORRHAGE: ICD-10-CM

## 2023-01-19 PROCEDURE — 99214 OFFICE O/P EST MOD 30 MIN: CPT

## 2023-01-19 RX ORDER — LEVOTHYROXINE SODIUM 0.07 MG/1
1 TABLET ORAL DAILY
COMMUNITY
Start: 2022-08-15 | End: 2023-01-19 | Stop reason: SDUPTHER

## 2023-01-19 NOTE — PROGRESS NOTES
PATIENT INFORMATION  Tammy Montgomery       - 1950    CHIEF COMPLAINT  Chief Complaint   Patient presents with   • Irritable Bowel Syndrome   • Heartburn       HISTORY OF PRESENT ILLNESS  Here today for IBS follow-up    Did have a few loose stools a few weeks ago and was alarmed that it occurred with cholestyramine. I called patient and it had resolved, and reviewed expectations with IBS and that bowel constistency can vary. Occasionally will have 2 BM, but sometimes the end of 2nd will be soft. No bleeding. Is anxious over the appearance of stool, end of it can be small in diameter, but has regular size BM every day. Avoids spicy foods and red sauce.    GERD managed with BID omeprazole. No odynophagia, dysphagia, nausea, vomiting, abdominal pain, bloating, belching. Rare breakthrough symptoms, sometimes feels reflux if eats too much and bends over (twice in 2 yrs). No OTC antacids.    20 EGD and colon with duodenal ulcers, gastrtis, reflux esophagitis, negative for HP, Barretts, celiac. Normal colon with normal random biopsies, previous history polyps.    Lost weight with covid, but has regained and worried about weight gain, but acknowledges she is not eating best. Discussed calorie tracking, is walking a mile.  Eats out of boredom.     Reviewed colon screening recommendations.      REVIEWED PERTINENT RESULTS/ LABS  Lab Results   Component Value Date    CASEREPORT  2020     Surgical Pathology Report                         Case: [id number]                                  Authorizing Provider:  Aditya Dias        Collected:           2020 01:38 PM                                 MD Dehsaun                                                                   Ordering Location:     [facility]   Received:            2020 04:36 PM                                 OR                                                                           Pathologist:           Kai  Rashad NICKERSON MD                                                         Specimens:   1) - Stomach                                                                                        2) - Esophagus, Distal                                                                              3) - Small Intestine, Duodenum                                                                      4) - Large Intestine, Cecum, polyp x 2                                                              5) - Large Intestine, Right / Ascending Colon, biopsy                                               6) - Large Intestine, Left / Descending Colon, biopsy                                               7) - Large Intestine, Rectum, cold snare                                                   FINALDX  12/08/2020     1. Stomach, Biopsy: Benign gastric mucosa with    A. Mild chronic inflammation (mild non-specific chronic gastritis).   B. No intestinal metaplasia.   C. No Helicobacter pylori.     2. Esophagus, Distal, Biopsy: Benign squamous and gastric mucosa with    A. Mild chronic inflammation of the gastric mucosa.   B. No intestinal metaplasia.   C. No Helicobacter pylori.    3. Duodenum, Biopsy: Benign small bowel mucosa with    A. Ulceration and ulcerative debris.   B. Blunting of the villi.   C. Herniation of Brunner's glands in the lamina propria.   D. Foveolar metaplasia.    Comment: The features are those of a non-specific/peptic duodenitis with erosion and ulceration.     4. Cecum, Biopsy: Benign colonic mucosa with   A. No hyperplastic or tubulovillous change.   B. No significant inflammation.   C. No crypt distortion or basement membrane thickening.   D. No viral inclusions or other organisms on routinely stained sections.   E. Prominent lymphoid aggregates.    5. Colon, Right Ascending, Biopsy: Benign colonic mucosa with   A. No hyperplastic or tubulovillous change.   B. No significant inflammation.   C. No crypt distortion or  basement membrane thickening.   D. No viral inclusions or other organisms on routinely stained sections.    6. Colon, Left Descending, Benign colonic mucosa with   A. No hyperplastic or tubulovillous change.   B. No significant inflammation.   C. No crypt distortion or basement membrane thickening.   D. No viral inclusions or other organisms on routinely stained sections.    7. Rectum, Biopsy: Benign colonic mucosa with   A. No hyperplastic or tubulovillous change.   B. No significant inflammation.   C. No crypt distortion or basement membrane thickening.   D. No viral inclusions or other organisms on routinely stained sections.    gavi/pkm          Lab Results   Component Value Date    HGB 12.9 10/05/2021    MCV 91.0 10/05/2021     10/05/2021    ALT 20 03/10/2022    AST 28 03/10/2022    HGBA1C 4.5 07/19/2018    INR 1.0 03/07/2015    TRIG 46 05/18/2020    FERRITIN 90.9 01/09/2019    IRON 75 01/09/2019    TIBC 327 01/09/2019      No results found.    REVIEW OF SYSTEMS  Review of Systems   Constitutional: Positive for appetite change (COVID) and unexpected weight change (COVID).   HENT: Negative.    Eyes: Negative.    Respiratory: Negative.    Cardiovascular: Negative.    Gastrointestinal: Positive for abdominal distention and diarrhea.        IBS, GERD, Diverticulosis   Endocrine: Negative.    Genitourinary: Negative.    Musculoskeletal: Positive for back pain.   Skin: Negative.    Allergic/Immunologic: Negative.    Neurological: Negative.    Hematological: Negative.    Psychiatric/Behavioral: The patient is nervous/anxious.          ACTIVE PROBLEMS  Patient Active Problem List    Diagnosis    • Gastroesophageal reflux disease with esophagitis without hemorrhage [K21.00]    • Duodenal ulcer [K26.9]    • Irritable bowel syndrome with diarrhea [K58.0]    • Belching [R14.2]    • Diarrhea [R19.7]    • Abnormal CT of the abdomen [R93.5]    • Encounter for screening for malignant neoplasm of colon [Z12.11]    •  Sigmoid diverticulosis [K57.30]    • Personal history of colonic polyps [Z86.010]          PAST MEDICAL HISTORY  Past Medical History:   Diagnosis Date   • Anxiety    • Arthritis    • GERD (gastroesophageal reflux disease)    • Hypertension          SURGICAL HISTORY  Past Surgical History:   Procedure Laterality Date   • ADENOIDECTOMY     • APPENDECTOMY     • BRONCHOSCOPY ENDOSCOPY     • CHOLECYSTECTOMY     • COLONOSCOPY N/A 12/8/2020    Procedure: COLONOSCOPY, polypectomies, biopsies;  Surgeon: Aditya Dias MD;  Location: [facility];  Service: Gastroenterology;  Laterality: N/A;  Diverticulosis  Cecal polyp x 2  Right colon biopsy  Left colon biopsy  Rectal polyp (cold snare)   • ENDOSCOPY N/A 12/8/2020    Procedure: ESOPHAGOGASTRODUODENOSCOPY with biopsies ;  Surgeon: Aditya Dias MD;  Location: [facility] OR;  Service: Gastroenterology;  Laterality: N/A;  Ulcerative esophagitis  Gastritis  Duodenal erosions  Duodenal biopsy  Gastric biopsy  Distal esophagus biopsy   • TONSILLECTOMY     • WISDOM TOOTH EXTRACTION           FAMILY HISTORY  Family History   Problem Relation Age of Onset   • Colon cancer Neg Hx    • Colon polyps Neg Hx          SOCIAL HISTORY  Social History     Occupational History   • Not on file   Tobacco Use   • Smoking status: Former     Types: Cigarettes   • Smokeless tobacco: Never   Vaping Use   • Vaping Use: Never used   Substance and Sexual Activity   • Alcohol use: No   • Drug use: No   • Sexual activity: Yes     Partners: Male         CURRENT MEDICATIONS    Current Outpatient Medications:   •  ALPRAZolam (XANAX) 0.5 MG tablet, Take 0.25-0.5 mg by mouth 3 (Three) Times a Day As Needed., Disp: , Rfl:   •  amLODIPine (NORVASC) 2.5 MG tablet, Take 2.5 mg by mouth Daily., Disp: , Rfl:   •  atenolol (TENORMIN) 100 MG tablet, 50 mg., Disp: , Rfl:   •  cholestyramine (QUESTRAN) 4 GM/DOSE powder, DISSOLVE 1 SCOOP OF POWDER IN LIQUID AND DRINK BY MOUTH NIGHTLY, Disp: 378 g,  "Rfl: 11  •  fluticasone (FLONASE) 50 MCG/ACT nasal spray, 2 sprays by Alternating Nares route Daily., Disp: , Rfl:   •  hydrocortisone 2.5 % ointment, Apply 1 application topically to the appropriate area as directed 2 (Two) Times a Day., Disp: 30 g, Rfl: 11  •  levocetirizine (XYZAL) 5 MG tablet, Take 5 mg by mouth Every Evening., Disp: , Rfl:   •  levothyroxine (SYNTHROID, LEVOTHROID) 75 MCG tablet, Take 75 mcg by mouth Daily., Disp: , Rfl:   •  Multiple Vitamins-Minerals (CENTRUM SILVER PO), Take  by mouth., Disp: , Rfl:   •  omeprazole (priLOSEC) 40 MG capsule, TAKE 1 CAPSULE BY MOUTH ONCE DAILY WITH BREAKFAST AND WITH SUPPER, Disp: 180 capsule, Rfl: 0  •  saccharomyces boulardii (FLORASTOR) 250 MG capsule, Take 250 mg by mouth 2 (Two) Times a Day., Disp: , Rfl:   •  VITAMIN D PO, Take  by mouth., Disp: , Rfl:   No current facility-administered medications for this visit.    Facility-Administered Medications Ordered in Other Visits:   •  lactated ringers infusion, 100 mL/hr, Intravenous, Continuous, Chris Valadez CRNA  •  ondansetron (ZOFRAN) injection 4 mg, 4 mg, Intravenous, Once PRN, Chris Valadez CRNA    ALLERGIES  Codeine, Methylprednisolone, Penicillins, Sulfa antibiotics, and Tetanus immune globulin    VITALS  Vitals:    01/19/23 1306   BP: 126/78   BP Location: Left arm   Patient Position: Sitting   Cuff Size: Adult   Weight: 77.3 kg (170 lb 6.4 oz)   Height: 172.7 cm (67.99\")       PHYSICAL EXAM  Debilities/Disabilities Identified: None  Emotional Behavior: Appropriate  Wt Readings from Last 3 Encounters:   01/19/23 77.3 kg (170 lb 6.4 oz)   05/02/22 76.7 kg (169 lb)   05/05/21 82 kg (180 lb 12.8 oz)     Ht Readings from Last 1 Encounters:   01/19/23 172.7 cm (67.99\")     Body mass index is 25.92 kg/m².  Physical Exam  Constitutional:       General: She is not in acute distress.     Appearance: Normal appearance. She is not ill-appearing.   HENT:      Head: Normocephalic and atraumatic. "      Mouth/Throat:      Mouth: Mucous membranes are moist.      Pharynx: No posterior oropharyngeal erythema.   Eyes:      General: No scleral icterus.  Cardiovascular:      Rate and Rhythm: Normal rate and regular rhythm.      Heart sounds: Normal heart sounds.   Pulmonary:      Effort: Pulmonary effort is normal.      Breath sounds: Normal breath sounds.   Abdominal:      General: Abdomen is flat. Bowel sounds are normal. There is no distension.      Palpations: Abdomen is soft. There is no mass.      Tenderness: There is abdominal tenderness in the epigastric area. There is no guarding or rebound. Negative signs include Rodriguez's sign.      Hernia: No hernia is present.   Musculoskeletal:      Cervical back: Neck supple.   Skin:     General: Skin is warm.      Capillary Refill: Capillary refill takes less than 2 seconds.   Neurological:      General: No focal deficit present.      Mental Status: She is alert and oriented to person, place, and time.   Psychiatric:         Mood and Affect: Mood normal.         Behavior: Behavior normal.         Thought Content: Thought content normal.         Judgment: Judgment normal.         CLINICAL DATA REVIEWED   reviewed previous lab results and integrated with today's visit, reviewed notes from other physicians and/or last GI encounter, reviewed previous endoscopy results and available photos, reviewed surgical pathology results from previous biopsies    ASSESSMENT  Diagnoses and all orders for this visit:    Irritable bowel syndrome with diarrhea    Gastroesophageal reflux disease with esophagitis without hemorrhage    Personal history of colonic polyps    Other orders  -     Discontinue: levothyroxine (SYNTHROID, LEVOTHROID) 75 MCG tablet; Take 1 tablet by mouth Daily.          PLAN    Continue current management  OTC antacids ok if flares     Return in about 6 months (around 7/19/2023).    I have discussed the above plan with the patient.  They verbalize understanding and  are in agreement with the plan.  They have been advised to contact the office for any questions, concerns, or changes related to their health.

## 2023-01-27 ENCOUNTER — TELEPHONE (OUTPATIENT)
Dept: GASTROENTEROLOGY | Facility: CLINIC | Age: 73
End: 2023-01-27
Payer: MEDICARE

## 2023-01-27 NOTE — TELEPHONE ENCOUNTER
PT wants to ask for a call from Mary. She is having diarrhea, 4x today. Pt is worried that it might be worrisome.    No blood, no temp, no vomiting. PT is drinking plenty of water.    PT worries that IBS may be flaring.

## 2023-01-30 ENCOUNTER — TELEPHONE (OUTPATIENT)
Dept: GASTROENTEROLOGY | Facility: CLINIC | Age: 73
End: 2023-01-30
Payer: MEDICARE

## 2023-01-30 NOTE — TELEPHONE ENCOUNTER
Reviewed with patient. Had 4 BM today which started solid then were liquid. Had not had one for several hours as of Friday at 16:15. Encouraged to increase fiber and reassured that it is ok for her bowels to have some variation. No fever, chills, or other symptoms. Admits she is having a lot of family stress recently.

## 2023-02-21 ENCOUNTER — TELEPHONE (OUTPATIENT)
Dept: GASTROENTEROLOGY | Facility: CLINIC | Age: 73
End: 2023-02-21
Payer: MEDICARE

## 2023-02-21 NOTE — TELEPHONE ENCOUNTER
Returned patient's call.  She was concerned about the size of her BM. She is not having any pain and it is solid.   She has have two larger BM in the last two days and was just concerned that she was doing something wrong.  I advised patient that it can be normal. As long as she is not having any issues, continue doing what she is doing.   Patient gave verbal understanding.

## 2023-02-21 NOTE — TELEPHONE ENCOUNTER
PT called wanting to talk to Mary regarding the alarming size of her bowel movements.     No pain or issues.    Would like a call back

## 2023-03-14 ENCOUNTER — TELEPHONE (OUTPATIENT)
Dept: GASTROENTEROLOGY | Facility: CLINIC | Age: 73
End: 2023-03-14
Payer: MEDICARE

## 2023-03-14 NOTE — TELEPHONE ENCOUNTER
Caller: Tammy Montgomery    Relationship to patient: Self    Best call back number:[phone]    Patient is needing: PT REQUESTING A CALLBACK IN REGARDS TO HER HEMOGLOBIN LEVELS. PT STATED THAT HER LEVELS ARE AT 11.9. PT STATED THAT SHE IS HAVING ONE BIG BOWL MOVEMENT EVERYDAY. PT WAS ASKING IF THEIR HEMOGLOBIN LEVEL HAS ANYTHING TO DO WITH HER BOWL MOVEMENT. PLEASE CALL BACK.

## 2023-03-14 NOTE — TELEPHONE ENCOUNTER
SPOKE WITH RN - PER EDIN HEMOGLOBIN IS NORMAL.  CALLED PATIENT LET PT KNOW SHE VERBALIZED UNDERSTANDING.

## 2023-03-14 NOTE — TELEPHONE ENCOUNTER
Caller: Tammy Montgomery    Relationship to patient: Self    Best call back number: [phone]    Patient is needing: PATIENT CALLED BACK AND WANTED OFFICE TO KNOW THAT THERE IS NO BLOOD IN HER STOOL.

## 2023-04-26 ENCOUNTER — TELEPHONE (OUTPATIENT)
Dept: GASTROENTEROLOGY | Facility: CLINIC | Age: 73
End: 2023-04-26
Payer: MEDICARE

## 2023-04-26 NOTE — TELEPHONE ENCOUNTER
Pt calls c/o 1 episode of passing loose feces while urinating this afternoon.  Has been having normal, daily BMs.  She states that she consumed rather greasy, spicy Mexican food and pizza the last 2 days.  I advised to avoid fried, greasy, heavy foods the next few days and stick to baked meats, bland foods for a few days to calm her stomach.  Advised to take Gas X as needed (she is c/o lots of gas).  She is very anxious and communicating with online doctors.  I advised that these doctors do not have access to her chart/hx to appropriately advise her and that she should only seek the advice of her established care providers.  Patient voiced understanding.

## 2023-04-26 NOTE — TELEPHONE ENCOUNTER
PT called in and wants Jessica to call her back to go over some restroom issues she been dealing with

## 2023-05-06 DIAGNOSIS — K21.00 GASTROESOPHAGEAL REFLUX DISEASE WITH ESOPHAGITIS WITHOUT HEMORRHAGE: ICD-10-CM

## 2023-05-06 DIAGNOSIS — R14.2 BELCHING: ICD-10-CM

## 2023-05-08 RX ORDER — OMEPRAZOLE 40 MG/1
CAPSULE, DELAYED RELEASE ORAL
Qty: 180 CAPSULE | Refills: 3 | Status: SHIPPED | OUTPATIENT
Start: 2023-05-08

## 2023-05-16 ENCOUNTER — TELEPHONE (OUTPATIENT)
Dept: GASTROENTEROLOGY | Facility: CLINIC | Age: 73
End: 2023-05-16
Payer: MEDICARE

## 2023-05-16 NOTE — TELEPHONE ENCOUNTER
"Pt calls stating that her PCP bertram labs on her, and both her Hgb and RBC were around 1 point lower than her last lab report.  She contacted both her PCP and hematologist (both Adriel), and they both told her this change was of no significance and to not worry about it.  However, she wanted your opinion, as well.  I advised her that you would just defer to her PCP and hematologist, since the labs were ordered by the PCP and blood is the hematologist's specialty.  Furthermore, the lab results have not been loaded on CareEverywhere yet for you to see.    The patient alluded to her affect and worries that everyone will think she is \"crazy\".  Also, she mentioned that her mental and physical states were much better when her  was alive and her children lived close.  Based on this, I suggested that, perhaps, her mental state is perhaps manifesting actual physical symptoms.  I inquired if she has ever seen a psychologist or counselor to discuss her loneliness and overthinking.  She stated that she consults her  some, but has never seen a counselor.  She voiced interest in this and asked for a referral.  I suggested Tristan Morrell, as he is located in [facility] and also uses [facility] in his therapy.  I provided her with his contact information, and she is going to look into counseling.  "

## 2023-06-16 RX ORDER — CHOLESTYRAMINE 4 G/9G
POWDER, FOR SUSPENSION ORAL
Qty: 360 G | Refills: 2 | Status: SHIPPED | OUTPATIENT
Start: 2023-06-16

## 2023-09-05 ENCOUNTER — TELEPHONE (OUTPATIENT)
Dept: GASTROENTEROLOGY | Facility: CLINIC | Age: 73
End: 2023-09-05
Payer: MEDICARE

## 2023-09-05 NOTE — TELEPHONE ENCOUNTER
Spoke with Mary, can take Omeprazole now.  Will not hurt.    Called and notify patient.  She understands.

## 2023-09-05 NOTE — TELEPHONE ENCOUNTER
Transfer from [facility].    Calling to see if she can take her OMEPRAZOLE early?  Went out for birthday party and eat big lunch.  Just got home and still full.  Can I take my OMEPRAZOLE now or wait until I have my soup later this evening.  Please call.

## 2023-09-07 RX ORDER — CHOLESTYRAMINE 4 G/9G
POWDER, FOR SUSPENSION ORAL
Qty: 378 G | Refills: 0 | Status: SHIPPED | OUTPATIENT
Start: 2023-09-07

## 2023-10-02 ENCOUNTER — TELEPHONE (OUTPATIENT)
Dept: GASTROENTEROLOGY | Facility: CLINIC | Age: 73
End: 2023-10-02

## 2023-10-02 NOTE — TELEPHONE ENCOUNTER
Provider: GLENN ADAMS    Caller: LISA    Relationship to Patient: SELF    Pharmacy:   [facility] [address] [facility] [facility] [address] [facility] [phone] [facility] [phone]  [phone]     Phone Number: [phone]    Reason for Call: THE PATIENT CALLED IN, SHE HAS BEEN TAKING 2 40MG OMEPRAZOLE A DAY FOR THE LAST 2 YEARS. SHE HAS HAD A PHARMACIST AND OTHER PEOPLE THAT DOSE IS TO HIGH. SHE HAS BEEN DEALING WITH GAS, CONSTIPATION AND BELCHING FOR AWHILE AND SHE LOOKED IT UP AND SAW THIS MEDICATION CAN CAUSE THAT. SHE WOULD LIKE TO SPEAK WITH GLENN OR HER NURSE REGARDING THIS PLEASE. PLEASE CONTACT THE PATIENT AND IF YOU CAN'T REACH HER YOU CAN LEAVE A DETAILED VOICEMAIL.

## 2023-10-02 NOTE — TELEPHONE ENCOUNTER
Spoke with patient, per APRN, PPI twice daily is not too high of dose.   Patient gave verbal understanding, she denied an appointment at this time.

## 2023-10-04 RX ORDER — CHOLESTYRAMINE 4 G/9G
POWDER, FOR SUSPENSION ORAL
Qty: 378 G | Refills: 0 | Status: SHIPPED | OUTPATIENT
Start: 2023-10-04

## 2023-11-02 ENCOUNTER — TELEPHONE (OUTPATIENT)
Dept: GASTROENTEROLOGY | Facility: CLINIC | Age: 73
End: 2023-11-02

## 2023-11-02 NOTE — TELEPHONE ENCOUNTER
Per Mary verbally, reminded pt to make sure to take Questran, fiber, and drink water.  Cx'ld 1/2024 appt and r/s to tomorrow w/Mary, as patient is actually overdue for appt and very worried.

## 2023-11-02 NOTE — TELEPHONE ENCOUNTER
Provider: MELISSA ADAMS    Caller: LISA ZAPATA    Relationship to Patient: SELF    Pharmacy: [facility]    Phone Number: [phone]    Reason for Call: PT IS HAVING DIARRHEA BOUTS - NOT SURE IF IBS. THINKS THAT WHEN SHE GETS UPSET IT MAY CAUSE  THIS ISSUES. WANTED TO KNOW IF SHOULD DRINK MORE WATER TO AVOID DEHYDRATION.     PT IS NEEDING REASSURANCE THAT THIS IS NORMAL OR WHAT SHE NEEDS TO DO.  PT DID HAVE REGULAR BOWEL MOVEMENT THIS MORNING AFTER BREAKFAST.     When was the patient last seen: 1/19/23    When did it start: TODAY AFTER 12:30PM AFTER THE CABLE PERSON CORRECTED EVERYTHING.    Characteristics of symptom/severity: HAS HAD DIARRHEA 4 TIMES TODAY    Timing- Is it constant or intermittent: INTERMITTENT    What makes it worse: ANXIETY OR WHEN GETS UPSET    What makes it better: WHEN PT CALLS DOWN    What therapies/medications have you tried:

## 2023-11-03 ENCOUNTER — OFFICE VISIT (OUTPATIENT)
Dept: GASTROENTEROLOGY | Facility: CLINIC | Age: 73
End: 2023-11-03
Payer: MEDICARE

## 2023-11-03 VITALS
HEIGHT: 68 IN | BODY MASS INDEX: 26.86 KG/M2 | WEIGHT: 177.2 LBS | SYSTOLIC BLOOD PRESSURE: 118 MMHG | DIASTOLIC BLOOD PRESSURE: 82 MMHG

## 2023-11-03 DIAGNOSIS — Z86.010 PERSONAL HISTORY OF COLONIC POLYPS: ICD-10-CM

## 2023-11-03 DIAGNOSIS — K58.0 IRRITABLE BOWEL SYNDROME WITH DIARRHEA: ICD-10-CM

## 2023-11-03 DIAGNOSIS — K21.00 GASTROESOPHAGEAL REFLUX DISEASE WITH ESOPHAGITIS WITHOUT HEMORRHAGE: Primary | ICD-10-CM

## 2023-11-03 NOTE — PROGRESS NOTES
PATIENT INFORMATION  Tammy Montgomery       - 1950    CHIEF COMPLAINT  Chief Complaint   Patient presents with    Irritable Bowel Syndrome    Heartburn       HISTORY OF PRESENT ILLNESS    Here today for IBS-C and GERD follow-up    IBS-C Managed on cholestyramine at night. Does have anxiety surrounding consistency and appearance of Stool. Had loose stools x3 and was really stressed out over something at home. Back to normal and bowels are usually fine. Reviewed cause of sx with patient and reassured patient everything is ok.     GERD managed with BID omeprazole. No odynophagia, dysphagia, nausea, vomiting, abdominal pain, bloating, belching. Rare breakthrough symptoms, sometimes feels reflux if eats too much and bends over (twice in 2 yrs). No OTC antacids.    20 EGD and colon with duodenal ulcers, gastrtis, reflux esophagitis, negative for HP, Barretts, celiac. Normal colon with normal random biopsies, previous history polyps.    Intermittent fasting and walking daily and not losing weight. Reviewed calorie restriction with patient and encouraged to track calories.    Anxiety is severe, on xanax for years, but not treated with anything else. Reviewed with patient that xanax is more of a rescue med and patient would benefit from something like an SSRI to better manage.     Irritable Bowel Syndrome  Pertinent negatives include no abdominal pain, nausea or vomiting.   Heartburn  She reports no abdominal pain or no nausea.       REVIEWED PERTINENT RESULTS/ LABS  Lab Results   Component Value Date    CASEREPORT  2020     Surgical Pathology Report                         Case: [id number]                                  Authorizing Provider:  Aditya Dias        Collected:           2020 01:38 PM                                 MD Deshaun                                                                   Ordering Location:     [facility]   Received:            2020 04:36 PM                                  OR                                                                           Pathologist:           Rashad Quinn MD                                                         Specimens:   1) - Stomach                                                                                        2) - Esophagus, Distal                                                                              3) - Small Intestine, Duodenum                                                                      4) - Large Intestine, Cecum, polyp x 2                                                              5) - Large Intestine, Right / Ascending Colon, biopsy                                               6) - Large Intestine, Left / Descending Colon, biopsy                                               7) - Large Intestine, Rectum, cold snare                                                   FINALDX  12/08/2020     1. Stomach, Biopsy: Benign gastric mucosa with    A. Mild chronic inflammation (mild non-specific chronic gastritis).   B. No intestinal metaplasia.   C. No Helicobacter pylori.     2. Esophagus, Distal, Biopsy: Benign squamous and gastric mucosa with    A. Mild chronic inflammation of the gastric mucosa.   B. No intestinal metaplasia.   C. No Helicobacter pylori.    3. Duodenum, Biopsy: Benign small bowel mucosa with    A. Ulceration and ulcerative debris.   B. Blunting of the villi.   C. Herniation of Brunner's glands in the lamina propria.   D. Foveolar metaplasia.    Comment: The features are those of a non-specific/peptic duodenitis with erosion and ulceration.     4. Cecum, Biopsy: Benign colonic mucosa with   A. No hyperplastic or tubulovillous change.   B. No significant inflammation.   C. No crypt distortion or basement membrane thickening.   D. No viral inclusions or other organisms on routinely stained sections.   E. Prominent lymphoid aggregates.    5. Colon, Right Ascending, Biopsy:  Benign colonic mucosa with   A. No hyperplastic or tubulovillous change.   B. No significant inflammation.   C. No crypt distortion or basement membrane thickening.   D. No viral inclusions or other organisms on routinely stained sections.    6. Colon, Left Descending, Benign colonic mucosa with   A. No hyperplastic or tubulovillous change.   B. No significant inflammation.   C. No crypt distortion or basement membrane thickening.   D. No viral inclusions or other organisms on routinely stained sections.    7. Rectum, Biopsy: Benign colonic mucosa with   A. No hyperplastic or tubulovillous change.   B. No significant inflammation.   C. No crypt distortion or basement membrane thickening.   D. No viral inclusions or other organisms on routinely stained sections.    [facility]/pkm          Lab Results   Component Value Date    HGB 12.4 10/12/2023    MCV 94.4 10/12/2023     10/12/2023    ALT 20 03/10/2022    AST 28 03/10/2022    HGBA1C 5.4 2023    INR 1.0 2015    TRIG 46 2020    FERRITIN 90.9 2019    IRON 75 2019    TIBC 327 2019      Lung Cancer Screening CT    Result Date: 10/5/2023  Narrative: REVIEWING YOUR TEST RESULTS IN [facility] IS NOT A SUB[facility] FOR DISCUSSING THOSE RESULTS WITH YOUR HEALTH CARE PROVIDER. PLEASE CONTACT YOUR PROVIDER VIA [employer] TO DISCUSS ANY QUESTIONS OR CONCERNS YOU MAY HAVE REGARDING THESE TEST RESULTS.  RADIOLOGY REPORT FACILITY:  [facility] UNIT/AGE/GENDER: ALESIA.BCT  OP      AGE:73 Y          SEX:F PATIENT NAME/:  LISA ZAPATA OLE    1950 UNIT NUMBER:  [id number] ACCOUNT NUMBER:  [id number] ACCESSION NUMBER:  [id number] EXAMINATION: CT CHEST WITHOUT CONTRAST DATE: 10/05/2023 HISTORY: Lung cancer screening, >= 20 pk-yr smoking history (Age >= 50y). TECHNIQUE: CT chest without intravenous contrast. Radiation dose reduction techniques were used for the scan per the ALARA (As Low As Reasonably Achievable)  protocol. COMPARISON: Lung cancer screening October 4, 2022 FINDINGS: Lungs: No new suspicious pulmonary nodules. Scattered bilateral pulmonary micronodules appear similar. Scattered calcified granulomas. Interval improvement in previous right lower lobe tree-in-bud nodularity No consolidation or pleural effusion. Central airways are clear. Mild centrilobular emphysema. Mediastinum: Calcified mediastinal and hilar lymph nodes, likely the sequelae of prior granulomatous disease. Heart size is normal. Mitral annular calcifications. No pericardial effusion. Great vessels are normal in caliber. Thoracic aortic calcifications. Included thyroid is unremarkable. Esophagus is within normal limits. Abdomen: No gross abnormality within the included upper abdomen. Bones: Multilevel degenerative changes of the spine. IMPRESSION: LUNG Rads Category 2:  Benign. Continued annual screening is recommended with next low-dose chest CT in 12 months. Dictated by: Sean Starr M.D. Images and Report reviewed and interpreted by: Sean Starr M.D. <PS><Electronically signed by: Sean Starr M.D.> 10/05/2023 1238 D: 10/05/2023 1231 T: 10/05/2023 1231     REVIEW OF SYSTEMS  Review of Systems   Constitutional: Negative.    HENT: Negative.     Eyes: Negative.    Respiratory: Negative.     Cardiovascular: Negative.    Gastrointestinal:  Positive for diarrhea. Negative for abdominal pain, constipation, nausea and vomiting.        GERD, IBS-D   Endocrine: Negative.    Genitourinary: Negative.    Musculoskeletal: Negative.    Skin: Negative.    Allergic/Immunologic: Negative.    Neurological:  Positive for dizziness.   Psychiatric/Behavioral:  The patient is nervous/anxious.          ACTIVE PROBLEMS  Patient Active Problem List    Diagnosis     Gastroesophageal reflux disease with esophagitis without hemorrhage [K21.00]     Duodenal ulcer [K26.9]     Irritable bowel syndrome with diarrhea [K58.0]     Belching [R14.2]     Diarrhea  [R19.7]     Abnormal CT of the abdomen [R93.5]     Encounter for screening for malignant neoplasm of colon [Z12.11]     Sigmoid diverticulosis [K57.30]     Personal history of colonic polyps [Z86.010]          PAST MEDICAL HISTORY  Past Medical History:   Diagnosis Date    Anxiety     Arthritis     GERD (gastroesophageal reflux disease)     Hypertension     Irritable bowel syndrome          SURGICAL HISTORY  Past Surgical History:   Procedure Laterality Date    ADENOIDECTOMY      APPENDECTOMY      BRONCHOSCOPY ENDOSCOPY      CHOLECYSTECTOMY      COLONOSCOPY N/A 12/8/2020    Procedure: COLONOSCOPY, polypectomies, biopsies;  Surgeon: Aditya Dias MD;  Location: [facility];  Service: Gastroenterology;  Laterality: N/A;  Diverticulosis  Cecal polyp x 2  Right colon biopsy  Left colon biopsy  Rectal polyp (cold snare)    ENDOSCOPY N/A 12/8/2020    Procedure: ESOPHAGOGASTRODUODENOSCOPY with biopsies ;  Surgeon: Aditya Dias MD;  Location: [facility];  Service: Gastroenterology;  Laterality: N/A;  Ulcerative esophagitis  Gastritis  Duodenal erosions  Duodenal biopsy  Gastric biopsy  Distal esophagus biopsy    TONSILLECTOMY      WISDOM TOOTH EXTRACTION           FAMILY HISTORY  Family History   Problem Relation Age of Onset    Colon cancer Neg Hx     Colon polyps Neg Hx          SOCIAL HISTORY  Social History     Occupational History    Not on file   Tobacco Use    Smoking status: Former     Types: Cigarettes     Passive exposure: Past    Smokeless tobacco: Never   Vaping Use    Vaping Use: Never used   Substance and Sexual Activity    Alcohol use: No    Drug use: No    Sexual activity: Yes     Partners: Male         CURRENT MEDICATIONS    Current Outpatient Medications:     ALPRAZolam (XANAX) 0.5 MG tablet, Take 0.5-1 tablets by mouth 3 (Three) Times a Day As Needed., Disp: , Rfl:     amLODIPine (NORVASC) 2.5 MG tablet, Take 1 tablet by mouth Daily., Disp: , Rfl:     atenolol (TENORMIN) 100 MG  "tablet, 0.5 tablets., Disp: , Rfl:     cholestyramine (QUESTRAN) 4 GM/DOSE powder, DISSOLVE 1 SCOOP IN LIQUID AND DRINK NIGHTLY AS DIRECTED, Disp: 378 g, Rfl: 0    fluticasone (FLONASE) 50 MCG/ACT nasal spray, 2 sprays by Alternating Nares route Daily., Disp: , Rfl:     hydrocortisone 2.5 % ointment, APPLY  OINTMENT EXTERNALLY TO AFFECTED AREA TWICE DAILY AS DIRECTED, Disp: 29 g, Rfl: 0    levocetirizine (XYZAL) 5 MG tablet, Take 1 tablet by mouth Every Evening., Disp: , Rfl:     levothyroxine (SYNTHROID, LEVOTHROID) 75 MCG tablet, Take 1 tablet by mouth Daily., Disp: , Rfl:     Multiple Vitamins-Minerals (CENTRUM SILVER PO), Take  by mouth., Disp: , Rfl:     omeprazole (priLOSEC) 40 MG capsule, TAKE 1 CAPSULE BY MOUTH TWICE DAILY WITH BREAKFAST WITH SUPPER, Disp: 180 capsule, Rfl: 3    saccharomyces boulardii (FLORASTOR) 250 MG capsule, Take 1 capsule by mouth 2 (Two) Times a Day., Disp: , Rfl:     VITAMIN D PO, Take  by mouth., Disp: , Rfl:   No current facility-administered medications for this visit.    Facility-Administered Medications Ordered in Other Visits:     lactated ringers infusion, 100 mL/hr, Intravenous, Continuous, Chris Valadez CRNA    ondansetron (ZOFRAN) injection 4 mg, 4 mg, Intravenous, Once PRN, Chris Valadez CRNA    ALLERGIES  Codeine, Methylprednisolone, Penicillins, Sulfa antibiotics, and Tetanus immune globulin    VITALS  Vitals:    11/03/23 1024   BP: 118/82   BP Location: Left arm   Patient Position: Sitting   Cuff Size: Large Adult   Weight: 80.4 kg (177 lb 3.2 oz)   Height: 172.7 cm (67.99\")       PHYSICAL EXAM    Debilities/Disabilities Identified: None  Emotional Behavior: Appropriate  Wt Readings from Last 3 Encounters:   11/03/23 80.4 kg (177 lb 3.2 oz)   01/19/23 77.3 kg (170 lb 6.4 oz)   05/02/22 76.7 kg (169 lb)     Ht Readings from Last 1 Encounters:   11/03/23 172.7 cm (67.99\")     Body mass index is 26.95 kg/m².  Physical Exam  Constitutional:       General: " She is not in acute distress.     Appearance: Normal appearance. She is not ill-appearing.   HENT:      Head: Normocephalic and atraumatic.      Mouth/Throat:      Mouth: Mucous membranes are moist.      Pharynx: No posterior oropharyngeal erythema.   Eyes:      General: No scleral icterus.  Cardiovascular:      Rate and Rhythm: Normal rate and regular rhythm.      Heart sounds: Normal heart sounds.   Pulmonary:      Effort: Pulmonary effort is normal.      Breath sounds: Normal breath sounds.   Abdominal:      General: Abdomen is flat. Bowel sounds are normal. There is no distension.      Palpations: Abdomen is soft. There is no mass.      Tenderness: There is no abdominal tenderness. There is no guarding or rebound. Negative signs include Rodriguez's sign.      Hernia: No hernia is present.   Musculoskeletal:      Cervical back: Neck supple.   Skin:     General: Skin is warm.      Capillary Refill: Capillary refill takes less than 2 seconds.   Neurological:      General: No focal deficit present.      Mental Status: She is alert and oriented to person, place, and time.   Psychiatric:         Mood and Affect: Mood normal.         Behavior: Behavior normal.         Thought Content: Thought content normal.         Judgment: Judgment normal.         CLINICAL DATA REVIEWED   reviewed previous lab results and integrated with today's visit, reviewed notes from other physicians and/or last GI encounter, reviewed previous endoscopy results and available photos, reviewed surgical pathology results from previous biopsies    ASSESSMENT  Diagnoses and all orders for this visit:    Gastroesophageal reflux disease with esophagitis without hemorrhage    Irritable bowel syndrome with diarrhea    Personal history of colonic polyps          PLAN    Continue cholestyramine  Calorie tracking for weight loss  Omeprazole BID  Review anxiety with PCP and consider SSRi    Return in about 6 months (around 5/3/2024).    I have discussed the  above plan with the patient.  They verbalize understanding and are in agreement with the plan.  They have been advised to contact the office for any questions, concerns, or changes related to their health.

## 2023-11-07 RX ORDER — CHOLESTYRAMINE 4 G/9G
POWDER, FOR SUSPENSION ORAL
Qty: 378 G | Refills: 0 | Status: SHIPPED | OUTPATIENT
Start: 2023-11-07

## 2023-12-11 RX ORDER — CHOLESTYRAMINE 4 G/9G
POWDER, FOR SUSPENSION ORAL
Qty: 378 G | Refills: 0 | Status: SHIPPED | OUTPATIENT
Start: 2023-12-11

## 2023-12-28 ENCOUNTER — TELEPHONE (OUTPATIENT)
Dept: GASTROENTEROLOGY | Facility: CLINIC | Age: 73
End: 2023-12-28
Payer: MEDICARE

## 2023-12-28 NOTE — TELEPHONE ENCOUNTER
PATIENT THINKS SHE MAY HAVE COVID.HAS HAD DIARRHEA, CHILLS, HEADACHE. WAS GOING TO SEE PCP BUT THEY NO LONGER DO WALK IN COVID TESTING.  SHE WAS THINKING DR MARIN WAS GOING TO PUT HER ON SOMETHING FOR THE DIARRHEA BUT NOW IS NOT GOING TO SEE HIM  TODAY .  HER QUESTIONS IS IF HE WAS TO PUT HER ON SOMETHING FOR DIARRHEA WOULD IT INTERFERE WITH THE CHOLESTYRAMINE THAT SHE TAKES?

## 2023-12-29 NOTE — TELEPHONE ENCOUNTER
"Advised patient as per Mary's instructions.  Pt states that she had 7 episodes of diarrhea yesterday, but none as of today.  Furthermore, she states that she has been \"holding\" her BM today.  Advised to go ahead and allow her BM to release. Her chills have abated and the headache has lessened.  She does not have a fever and has tested negative for Covid.  Pt is feeling better overall.    I reiterated that it is OK to take Tylenol for her headache and to use the imodium if the diarrhea returns copiously today.    "

## 2023-12-29 NOTE — TELEPHONE ENCOUNTER
Pt called wanting to know if she can take tylenol for her headache and imodium for diarrhea.  Wants to make sure it will not interfere with the two medications she is taking - omeprazole and cholestyramine.

## 2024-01-04 ENCOUNTER — TELEPHONE (OUTPATIENT)
Dept: GASTROENTEROLOGY | Facility: CLINIC | Age: 74
End: 2024-01-04
Payer: MEDICARE

## 2024-01-04 NOTE — TELEPHONE ENCOUNTER
"Advised pt that the pneumonia tx takes precedence over everything, and she must finish all the antibiotic.  Pt has probiotic on hand.  Advised her to make sure to take probiotic every day while on antibiotic, and the probiotic should help to keep her BM's \"normal\".  Pt voiced understanding.  "

## 2024-01-04 NOTE — TELEPHONE ENCOUNTER
Caller: King Tammy    Relationship: Self    Best call back number: [phone]     What is the best time to reach you: ANYTIME, VM IS FULL    Who are you requesting to speak with (clinical staff, provider,  specific staff member): CLINICAL    What was the call regarding: PT IS CALLING IN REGARDS TO MEDICATION SHE JUST GOT PUT ON DUE TO HAVING DOUBLE PNEUMONIA. PT IS CONCERNED BECAUSE THE MEDICATION CAN CAUSE DIARRHEA AND PT IS ON QUESTRAN DUE TO ALREADY HAVING DIARRHEA. PLEASE CALL PT BACK AND ADVISE.

## 2024-01-15 RX ORDER — CHOLESTYRAMINE 4 G/9G
POWDER, FOR SUSPENSION ORAL
Qty: 378 G | Refills: 11 | Status: SHIPPED | OUTPATIENT
Start: 2024-01-15

## 2024-01-25 ENCOUNTER — TELEPHONE (OUTPATIENT)
Dept: GASTROENTEROLOGY | Facility: CLINIC | Age: 74
End: 2024-01-25
Payer: MEDICARE

## 2024-01-25 NOTE — TELEPHONE ENCOUNTER
Pt calls. Currently in-pt @ [facility].  She is concerned that the hospitalist there is giving her iron for her anemia, and she has already been constipated.  She states that they have now ordered MIralax to offset the constipation.  She is calling our office for assurance that this is a good medical plan of care.  I advised her that she needs to be compliant with the physicians in the hospital, as we are not currently involved with her in-pt care.  She still wanted assurance.  I did state that iron is a standard treatment for anemia and that Miralax is a standard treatment for constipation, but I reiterated that we are not involved in her current care, and she does need to be compliant with the physician's orders in the hospital.  Patient voiced understanding.

## 2024-01-31 ENCOUNTER — TELEPHONE (OUTPATIENT)
Dept: GASTROENTEROLOGY | Facility: CLINIC | Age: 74
End: 2024-01-31

## 2024-01-31 NOTE — TELEPHONE ENCOUNTER
Caller: Tammy Montgomery    Relationship: Self    Best call back number: [phone]     What is the best time to reach you: ANYTIME, VM IS FULL    Who are you requesting to speak with (clinical staff, provider,  specific staff member): GLENN ADAMS    What was the call regarding: PT IS CALLING TO TALK TO GLENN ABOUT SOME MEDICATION, AND STEROIDS THAT THE HOSPITAL PUT HER ON WHEN SHE WAS ADMITTED FOR PNEUMONIA. PLEASE CALL PT BACK AND ADVISE.

## 2024-02-01 NOTE — TELEPHONE ENCOUNTER
Pt calls concerned that she was recently hospitalized with Pneumonia and was on several antibiotics.  She became anemic and was given iron causing her to be constipated.  Subsequently, she has been taking Miralax bid to offset her constipation d/t iron, and now having diarrhea.  She called her family physician today, Mari Kent,  who advised her to decrease her Miralax to once daily to, hopefully, slow down her diarrhea.  Patient just wanted Mary to know all the above.  I advised patient to continue with advice per Dr. Kent.

## 2024-04-28 DIAGNOSIS — K21.00 GASTROESOPHAGEAL REFLUX DISEASE WITH ESOPHAGITIS WITHOUT HEMORRHAGE: ICD-10-CM

## 2024-04-28 DIAGNOSIS — R14.2 BELCHING: ICD-10-CM

## 2024-04-29 RX ORDER — OMEPRAZOLE 40 MG/1
CAPSULE, DELAYED RELEASE ORAL
Qty: 180 CAPSULE | Refills: 3 | Status: SHIPPED | OUTPATIENT
Start: 2024-04-29

## 2024-06-19 ENCOUNTER — OFFICE VISIT (OUTPATIENT)
Dept: GASTROENTEROLOGY | Facility: CLINIC | Age: 74
End: 2024-06-19
Payer: MEDICARE

## 2024-06-19 ENCOUNTER — TELEPHONE (OUTPATIENT)
Dept: GASTROENTEROLOGY | Facility: CLINIC | Age: 74
End: 2024-06-19

## 2024-06-19 VITALS
SYSTOLIC BLOOD PRESSURE: 122 MMHG | HEIGHT: 68 IN | BODY MASS INDEX: 25.16 KG/M2 | DIASTOLIC BLOOD PRESSURE: 78 MMHG | WEIGHT: 166 LBS

## 2024-06-19 DIAGNOSIS — K21.00 GASTROESOPHAGEAL REFLUX DISEASE WITH ESOPHAGITIS WITHOUT HEMORRHAGE: ICD-10-CM

## 2024-06-19 DIAGNOSIS — K58.0 IRRITABLE BOWEL SYNDROME WITH DIARRHEA: Primary | ICD-10-CM

## 2024-06-19 DIAGNOSIS — D50.9 IRON DEFICIENCY ANEMIA, UNSPECIFIED IRON DEFICIENCY ANEMIA TYPE: ICD-10-CM

## 2024-06-19 NOTE — TELEPHONE ENCOUNTER
Patient called asking about Omeprazole causing liver and kidney damage. She also was asking if everything was okay when APRN was poking on her stomach. Will send message to APRN to review.

## 2024-06-19 NOTE — TELEPHONE ENCOUNTER
Caller: Ulises Tammy    Relationship: Self    Best call back number: [phone]    What is the best time to reach you: ANYTIME    Who are you requesting to speak with (clinical staff, provider,  specific staff member): MELISSA OLIVARES    What was the call regarding: PT HAS A COUPLE OF QUESTIONS SHE FORGOT TO ASK EARLIER AT HER APPT. PLEASE CONTACT TO ASSIST.

## 2024-06-19 NOTE — PROGRESS NOTES
PATIENT INFORMATION  Tammy Montgomery       - 1950    CHIEF COMPLAINT  Chief Complaint   Patient presents with    Irritable Bowel Syndrome    Heartburn       HISTORY OF PRESENT ILLNESS  Here today for IBS-C and GERD follow-up    Since , hospitalized with PNA and covid and difficulty recovering. Feelingg beter now and clear checkups with pulm. Led to JI, had dark and hard stools, sennakot caused cramping, prunes helped, off now iron studies normalized, bowels doing well. FOBT negative.    IBS-C Managed on cholestyramine at night. Does have anxiety surrounding consistency and appearance of Stool. TODAY: 1 good large solid BM every day, not hard. Eating more and better, higher fiber.    GERD managed with BID omeprazole. No odynophagia, dysphagia, nausea, vomiting, abdominal pain, bloating, belching. Rare breakthrough symptoms, sometimes feels reflux if eats too much and bends over (twice in 2 yrs). No OTC antacids.    20 EGD and colon with duodenal ulcers, gastrtis, reflux esophagitis, negative for HP, Barretts, celiac. Normal colon with normal random biopsies, previous history polyps.    Irritable Bowel Syndrome  Associated symptoms include neck pain. Pertinent negatives include no abdominal pain, nausea or vomiting.   Heartburn  She reports no abdominal pain or no nausea.       REVIEWED PERTINENT RESULTS/ LABS  Lab Results   Component Value Date    CASEREPORT  2020     Surgical Pathology Report                         Case: [id number]                                  Authorizing Provider:  Aditya Dias        Collected:           2020 01:38 PM                                 MD Deshaun                                                                   Ordering Location:     [facility]   Received:            2020 04:36 PM                                 OR                                                                           Pathologist:            Rashad Quinn MD                                                         Specimens:   1) - Stomach                                                                                        2) - Esophagus, Distal                                                                              3) - Small Intestine, Duodenum                                                                      4) - Large Intestine, Cecum, polyp x 2                                                              5) - Large Intestine, Right / Ascending Colon, biopsy                                               6) - Large Intestine, Left / Descending Colon, biopsy                                               7) - Large Intestine, Rectum, cold snare                                                   FINALDX  12/08/2020     1. Stomach, Biopsy: Benign gastric mucosa with    A. Mild chronic inflammation (mild non-specific chronic gastritis).   B. No intestinal metaplasia.   C. No Helicobacter pylori.     2. Esophagus, Distal, Biopsy: Benign squamous and gastric mucosa with    A. Mild chronic inflammation of the gastric mucosa.   B. No intestinal metaplasia.   C. No Helicobacter pylori.    3. Duodenum, Biopsy: Benign small bowel mucosa with    A. Ulceration and ulcerative debris.   B. Blunting of the villi.   C. Herniation of Brunner's glands in the lamina propria.   D. Foveolar metaplasia.    Comment: The features are those of a non-specific/peptic duodenitis with erosion and ulceration.     4. Cecum, Biopsy: Benign colonic mucosa with   A. No hyperplastic or tubulovillous change.   B. No significant inflammation.   C. No crypt distortion or basement membrane thickening.   D. No viral inclusions or other organisms on routinely stained sections.   E. Prominent lymphoid aggregates.    5. Colon, Right Ascending, Biopsy: Benign colonic mucosa with   A. No hyperplastic or tubulovillous change.   B. No significant inflammation.   C. No crypt distortion  or basement membrane thickening.   D. No viral inclusions or other organisms on routinely stained sections.    6. Colon, Left Descending, Benign colonic mucosa with   A. No hyperplastic or tubulovillous change.   B. No significant inflammation.   C. No crypt distortion or basement membrane thickening.   D. No viral inclusions or other organisms on routinely stained sections.    7. Rectum, Biopsy: Benign colonic mucosa with   A. No hyperplastic or tubulovillous change.   B. No significant inflammation.   C. No crypt distortion or basement membrane thickening.   D. No viral inclusions or other organisms on routinely stained sections.    gavi/pkm          Lab Results   Component Value Date    HGB 11.2 (L) 03/26/2024    MCV 91.4 03/26/2024     03/26/2024    ALT 14 02/23/2023    AST 27 02/23/2023    HGBA1C 5.3 04/03/2024    INR 1.0 03/07/2015    TRIG 46 05/18/2020    FERRITIN 132.2 03/26/2024    IRON 64 03/26/2024    TIBC 269 03/26/2024      No results found.    REVIEW OF SYSTEMS  Review of Systems   Constitutional: Negative.    HENT: Negative.     Eyes: Negative.    Respiratory: Negative.     Cardiovascular: Negative.    Gastrointestinal:  Negative for abdominal pain, constipation, diarrhea, nausea and vomiting.        PHX polyps, IBS-D, GERD   Endocrine: Negative.    Genitourinary: Negative.    Musculoskeletal:  Positive for back pain and neck pain.   Skin: Negative.    Allergic/Immunologic: Negative.    Neurological:  Positive for light-headedness.   Hematological: Negative.    Psychiatric/Behavioral: Negative.           ACTIVE PROBLEMS  Patient Active Problem List    Diagnosis     Gastroesophageal reflux disease with esophagitis without hemorrhage [K21.00]     Duodenal ulcer [K26.9]     Irritable bowel syndrome with diarrhea [K58.0]     Belching [R14.2]     Diarrhea [R19.7]     Abnormal CT of the abdomen [R93.5]     Encounter for screening for malignant neoplasm of colon [Z12.11]     Sigmoid diverticulosis  [K57.30]     Personal history of colonic polyps [Z86.010]          PAST MEDICAL HISTORY  Past Medical History:   Diagnosis Date    Anxiety     Arthritis     GERD (gastroesophageal reflux disease)     Hypertension     Irritable bowel syndrome          SURGICAL HISTORY  Past Surgical History:   Procedure Laterality Date    ADENOIDECTOMY      APPENDECTOMY      BRONCHOSCOPY ENDOSCOPY      CHOLECYSTECTOMY      COLONOSCOPY N/A 12/8/2020    Procedure: COLONOSCOPY, polypectomies, biopsies;  Surgeon: Aditya Dias MD;  Location: [facility] OR;  Service: Gastroenterology;  Laterality: N/A;  Diverticulosis  Cecal polyp x 2  Right colon biopsy  Left colon biopsy  Rectal polyp (cold snare)    ENDOSCOPY N/A 12/8/2020    Procedure: ESOPHAGOGASTRODUODENOSCOPY with biopsies ;  Surgeon: Aditya Dias MD;  Location: [facility] OR;  Service: Gastroenterology;  Laterality: N/A;  Ulcerative esophagitis  Gastritis  Duodenal erosions  Duodenal biopsy  Gastric biopsy  Distal esophagus biopsy    TONSILLECTOMY      WISDOM TOOTH EXTRACTION           FAMILY HISTORY  Family History   Problem Relation Age of Onset    Colon cancer Neg Hx     Colon polyps Neg Hx          SOCIAL HISTORY  Social History     Occupational History    Not on file   Tobacco Use    Smoking status: Former     Types: Cigarettes     Passive exposure: Past    Smokeless tobacco: Never   Vaping Use    Vaping status: Never Used   Substance and Sexual Activity    Alcohol use: No    Drug use: No    Sexual activity: Yes     Partners: Male         CURRENT MEDICATIONS    Current Outpatient Medications:     ALPRAZolam (XANAX) 0.5 MG tablet, Take 0.5-1 tablets by mouth 3 (Three) Times a Day As Needed., Disp: , Rfl:     amLODIPine (NORVASC) 2.5 MG tablet, Take 1 tablet by mouth Daily., Disp: , Rfl:     atenolol (TENORMIN) 100 MG tablet, 0.5 tablets., Disp: , Rfl:     cholestyramine (QUESTRAN) 4 GM/DOSE powder, DISSOLVE 1 SCOOP OF POWDER IN LIQUID AND DRINK BY MOUTH  "NIGHTLY AS DIRECTED, Disp: 378 g, Rfl: 11    fluticasone (FLONASE) 50 MCG/ACT nasal spray, 2 sprays by Alternating Nares route Daily., Disp: , Rfl:     hydrocortisone 2.5 % ointment, APPLY  OINTMENT EXTERNALLY TO AFFECTED AREA TWICE DAILY AS DIRECTED, Disp: 29 g, Rfl: 0    levocetirizine (XYZAL) 5 MG tablet, Take 1 tablet by mouth Every Evening., Disp: , Rfl:     levothyroxine (SYNTHROID, LEVOTHROID) 75 MCG tablet, Take 1 tablet by mouth Daily., Disp: , Rfl:     Multiple Vitamins-Minerals (CENTRUM SILVER PO), Take  by mouth., Disp: , Rfl:     omeprazole (priLOSEC) 40 MG capsule, TAKE 1 CAPSULE BY MOUTH TWICE DAILY WITH BREAKFAST WITH SUPPER, Disp: 180 capsule, Rfl: 3    saccharomyces boulardii (FLORASTOR) 250 MG capsule, Take 1 capsule by mouth 2 (Two) Times a Day., Disp: , Rfl:     VITAMIN D PO, Take  by mouth., Disp: , Rfl:   No current facility-administered medications for this visit.    Facility-Administered Medications Ordered in Other Visits:     lactated ringers infusion, 100 mL/hr, Intravenous, Continuous, Chris Valadez CRNA    ondansetron (ZOFRAN) injection 4 mg, 4 mg, Intravenous, Once PRN, Chris Valadez CRNA    ALLERGIES  Codeine, Methylprednisolone, Penicillins, Sulfa antibiotics, and Tetanus immune globulin    VITALS  Vitals:    06/19/24 1325   BP: 122/78   BP Location: Left arm   Patient Position: Sitting   Cuff Size: Large Adult   Weight: 75.3 kg (166 lb)   Height: 172.7 cm (67.99\")       PHYSICAL EXAM  Debilities/Disabilities Identified: None  Emotional Behavior: Appropriate  Wt Readings from Last 3 Encounters:   06/19/24 75.3 kg (166 lb)   11/03/23 80.4 kg (177 lb 3.2 oz)   01/19/23 77.3 kg (170 lb 6.4 oz)     Ht Readings from Last 1 Encounters:   06/19/24 172.7 cm (67.99\")     Body mass index is 25.25 kg/m².  Physical Exam  Constitutional:       General: She is not in acute distress.     Appearance: Normal appearance. She is not ill-appearing.   HENT:      Head: Normocephalic and " atraumatic.      Mouth/Throat:      Mouth: Mucous membranes are moist.      Pharynx: No posterior oropharyngeal erythema.   Eyes:      General: No scleral icterus.  Cardiovascular:      Rate and Rhythm: Normal rate and regular rhythm.      Heart sounds: Normal heart sounds.   Pulmonary:      Effort: Pulmonary effort is normal.      Breath sounds: Normal breath sounds.   Abdominal:      General: Abdomen is flat. Bowel sounds are normal. There is no distension.      Palpations: Abdomen is soft. There is no mass.      Tenderness: There is no abdominal tenderness. There is no guarding or rebound. Negative signs include Rodriguez's sign.      Hernia: No hernia is present.   Musculoskeletal:      Cervical back: Neck supple.   Skin:     General: Skin is warm.      Capillary Refill: Capillary refill takes less than 2 seconds.   Neurological:      General: No focal deficit present.      Mental Status: She is alert and oriented to person, place, and time.   Psychiatric:         Mood and Affect: Mood normal.         Behavior: Behavior normal.         Thought Content: Thought content normal.         Judgment: Judgment normal.         CLINICAL DATA REVIEWED   reviewed previous lab results and integrated with today's visit, reviewed notes from other physicians and/or last GI encounter, reviewed previous endoscopy results and available photos, reviewed surgical pathology results from previous biopsies    ASSESSMENT  Diagnoses and all orders for this visit:    Irritable bowel syndrome with diarrhea    Gastroesophageal reflux disease with esophagitis without hemorrhage    Iron deficiency anemia, unspecified iron deficiency anemia type          PLAN    Continue current management    Return in about 6 months (around 12/19/2024).    I have discussed the above plan with the patient.  They verbalize understanding and are in agreement with the plan.  They have been advised to contact the office for any questions, concerns, or changes related  to their health.

## 2024-06-20 NOTE — TELEPHONE ENCOUNTER
Patient returned call, advised patient that her recent labs of kidney and liver were good, no concern there.   Patient gave verbal understanding.

## 2024-06-27 ENCOUNTER — TELEPHONE (OUTPATIENT)
Dept: GASTROENTEROLOGY | Facility: CLINIC | Age: 74
End: 2024-06-27
Payer: MEDICARE

## 2024-06-27 NOTE — TELEPHONE ENCOUNTER
"Pt calls inquiring if eating \"garden\" vegetables is OK. She is worried about seeds. I advised pt to eat in moderation, make sure to rinse them well prior to consumption, and call if she develops any abdominal pain, fever, and/or diarrhea.  Pt voiced understanding.  "

## 2024-11-04 ENCOUNTER — TRANSCRIBE ORDERS (OUTPATIENT)
Dept: ADMINISTRATIVE | Facility: HOSPITAL | Age: 74
End: 2024-11-04
Payer: MEDICARE

## 2024-11-04 DIAGNOSIS — Z78.0 POSTMENOPAUSAL ESTROGEN DEFICIENCY: Primary | ICD-10-CM

## 2024-11-11 ENCOUNTER — APPOINTMENT (OUTPATIENT)
Dept: BONE DENSITY | Facility: HOSPITAL | Age: 74
End: 2024-11-11
Payer: MEDICARE

## 2024-11-11 DIAGNOSIS — Z78.0 POSTMENOPAUSAL ESTROGEN DEFICIENCY: ICD-10-CM

## 2024-11-11 PROCEDURE — 77080 DXA BONE DENSITY AXIAL: CPT

## 2024-12-12 ENCOUNTER — TELEPHONE (OUTPATIENT)
Dept: GASTROENTEROLOGY | Facility: CLINIC | Age: 74
End: 2024-12-12
Payer: MEDICARE

## 2024-12-12 NOTE — TELEPHONE ENCOUNTER
Hub staff attempted to follow warm transfer process and was unsuccessful     Caller: Tammy Montgomery    Relationship to patient: Self    Best call back number: [phone]      Patient is needing: PT RECEIVED A TEXT MESSAGE TO CHECK HER MYCHART BUT SHE CAN'T GET INTO IT TO CHECK THE MESSAGE. PT NOT SURE WHAT IT IS.  PLEASE CALL PT TO DISCUSS.

## 2025-01-03 ENCOUNTER — TELEPHONE (OUTPATIENT)
Dept: GASTROENTEROLOGY | Facility: CLINIC | Age: 75
End: 2025-01-03

## 2025-01-03 NOTE — TELEPHONE ENCOUNTER
Provider: GLENN TORRES     Caller: LISA ZAPATA     Relationship to Patient: SELF    Phone Number: [phone]     Reason for Call: PT CALLED AND STATED THAT EVERY KNOW AND THEN SHE WILL GET CONSTIPATION AND SHE DRINKS WARM PRUNE JUICE AND IS WONDERING IF THAT IS OK PLEASE ADVISE AND CALL PT BACK

## 2025-01-17 NOTE — TELEPHONE ENCOUNTER
"PT CALLED TO DISCUSS HER RECENT BM  THIS MORNING, WHEN SHE WAS WIPING SHE HAD TO KEEP WIPING A LOT -THE STOOL WAS LIKE \"PEANUT BUTTER\"    SHE WANTS TO KNOW IF THIS IS NORMAL?  SHE READ THAT SHE MAY NEED MORE FIBER  SHE WANTS TO KNOW IF SHE CAN EAT THE FOLLOWING  RAISINS  BRAN CEREAL  BANDANNA PUDDING  WAL NUTS    WILL EATING THOSE FOOD CAUSE HER TO HAVE DIARRHEA...    PLEASE CALL BACK    SHE WANTED TO LET JS KNOW SHE DOES FEEL FINE  "

## 2025-01-20 NOTE — TELEPHONE ENCOUNTER
Spoke with patient, she states everything is back to normal. Advised fiber if she starts having problems again.   Patient rescheduled appointment on 1/22/2025 due to the cold temperature.

## 2025-01-31 ENCOUNTER — TELEPHONE (OUTPATIENT)
Dept: GASTROENTEROLOGY | Facility: CLINIC | Age: 75
End: 2025-01-31
Payer: MEDICARE

## 2025-01-31 DIAGNOSIS — K58.0 IRRITABLE BOWEL SYNDROME WITH DIARRHEA: Primary | ICD-10-CM

## 2025-01-31 RX ORDER — AMITRIPTYLINE HYDROCHLORIDE 10 MG/1
10 TABLET ORAL NIGHTLY
Qty: 30 TABLET | Refills: 3 | Status: SHIPPED | OUTPATIENT
Start: 2025-01-31

## 2025-01-31 NOTE — TELEPHONE ENCOUNTER
"Spoke with patient.      Pt wants Mary to be aware that pt had pneumonia in 2024.  Pt had T7 FX in 2024.  Her older dog (her  's dog) just dx'd with CHF this past fall.  Pt states that since her dog's dx, pt having bouts of \"puddling consistency\" BM's @ least 3 times weekly to the point of incontinence those days.  Pt is still taking OTC fiber.    Pt sounded very depressed and anxious.   "

## 2025-01-31 NOTE — TELEPHONE ENCOUNTER
----- Message from Gaby FAJARDO sent at 1/31/2025  8:55 AM EST -----  Regarding: Would like to speak to a nurse  Please reach out to patient, she is having some issues that she would only like to discuss with clinical staff. She can be reached at [phone].    Thank you

## 2025-01-31 NOTE — TELEPHONE ENCOUNTER
Pt advised as per Elavil.  Pt nervous about taking this.  She has an appt with Mary on 2/4/25.  She states that she will  the medication and wait to start it after she sees Mary and can ask questions at the office visit on 2/4/25.

## 2025-02-04 ENCOUNTER — OFFICE VISIT (OUTPATIENT)
Dept: GASTROENTEROLOGY | Facility: CLINIC | Age: 75
End: 2025-02-04
Payer: MEDICARE

## 2025-02-04 VITALS
DIASTOLIC BLOOD PRESSURE: 78 MMHG | WEIGHT: 167 LBS | SYSTOLIC BLOOD PRESSURE: 122 MMHG | HEIGHT: 68 IN | BODY MASS INDEX: 25.31 KG/M2

## 2025-02-04 DIAGNOSIS — K21.00 GASTROESOPHAGEAL REFLUX DISEASE WITH ESOPHAGITIS WITHOUT HEMORRHAGE: ICD-10-CM

## 2025-02-04 DIAGNOSIS — Z86.0100 HISTORY OF COLONIC POLYPS: ICD-10-CM

## 2025-02-04 DIAGNOSIS — K58.0 IRRITABLE BOWEL SYNDROME WITH DIARRHEA: Primary | ICD-10-CM

## 2025-02-04 NOTE — PROGRESS NOTES
PATIENT INFORMATION  Tammy Montgomery       - 1950    CHIEF COMPLAINT  Chief Complaint   Patient presents with    Irritable Bowel Syndrome    Diarrhea    Heartburn    Iron deficient anemia    Depression       HISTORY OF PRESENT ILLNESS  Here today for IBS-D and GERD follow-up      IBS-D Per LOV: Managed on cholestyramine at night. Does have anxiety surrounding consistency and appearance of Stool. TODAY: 1 good large solid BM every day, not hard. Eating more and better, higher fiber. End of BM sometimes pasty and hard to clean up, 1 episode of gas with incontinence, fiber consistently, 1 cholestyramine packet at night. Overall she is doing pretty well and just has had a few episodes. Sometimes she is straining for a BM, reviewed decreasing cholestyramine powder and OK to use prune juice/miralax PRN.    Patient incredibly anxious, prescribed elavil after recent call, explained that she researched elavil at night and her and her family do not want her to take it. Reviewed that she is much more likely to have all of those side effects with xanax than low dose elavil, reviewed potential benefits of similar medication with IBS and added benefit may be better control of anxiety that has led to chronic xanax in patient with anxiety that remains poorly controlled. Reviewed that IBS control not optimized.     GERD managed with BID omeprazole. No odynophagia, dysphagia, nausea, vomiting, abdominal pain, bloating, belching. Rare breakthrough symptoms, sometimes feels reflux if eats too much and bends over (twice in 2 yrs). No OTC antacids. Still doing well.     20 EGD and colon with duodenal ulcers, gastrtis, reflux esophagitis, negative for HP, Barretts, celiac. Normal colon with normal random biopsies, previous history polyps.          REVIEWED PERTINENT RESULTS/ LABS  Lab Results   Component Value Date    CASEREPORT  2020     Surgical Pathology Report                         Case: [id number]                                   Authorizing Provider:  Aditya Dias        Collected:           12/08/2020 01:38 PM                                 MD Deshaun                                                                   Ordering Location:     [facility]   Received:            12/08/2020 04:36 PM                                 OR                                                                           Pathologist:           Rashad Quinn MD                                                         Specimens:   1) - Stomach                                                                                        2) - Esophagus, Distal                                                                              3) - Small Intestine, Duodenum                                                                      4) - Large Intestine, Cecum, polyp x 2                                                              5) - Large Intestine, Right / Ascending Colon, biopsy                                               6) - Large Intestine, Left / Descending Colon, biopsy                                               7) - Large Intestine, Rectum, cold snare                                                   FINALDX  12/08/2020     1. Stomach, Biopsy: Benign gastric mucosa with    A. Mild chronic inflammation (mild non-specific chronic gastritis).   B. No intestinal metaplasia.   C. No Helicobacter pylori.     2. Esophagus, Distal, Biopsy: Benign squamous and gastric mucosa with    A. Mild chronic inflammation of the gastric mucosa.   B. No intestinal metaplasia.   C. No Helicobacter pylori.    3. Duodenum, Biopsy: Benign small bowel mucosa with    A. Ulceration and ulcerative debris.   B. Blunting of the villi.   C. Herniation of Brunner's glands in the lamina propria.   D. Foveolar metaplasia.    Comment: The features are those of a non-specific/peptic duodenitis with erosion and ulceration.     4. Cecum, Biopsy: Benign  colonic mucosa with   A. No hyperplastic or tubulovillous change.   B. No significant inflammation.   C. No crypt distortion or basement membrane thickening.   D. No viral inclusions or other organisms on routinely stained sections.   E. Prominent lymphoid aggregates.    5. Colon, Right Ascending, Biopsy: Benign colonic mucosa with   A. No hyperplastic or tubulovillous change.   B. No significant inflammation.   C. No crypt distortion or basement membrane thickening.   D. No viral inclusions or other organisms on routinely stained sections.    6. Colon, Left Descending, Benign colonic mucosa with   A. No hyperplastic or tubulovillous change.   B. No significant inflammation.   C. No crypt distortion or basement membrane thickening.   D. No viral inclusions or other organisms on routinely stained sections.    7. Rectum, Biopsy: Benign colonic mucosa with   A. No hyperplastic or tubulovillous change.   B. No significant inflammation.   C. No crypt distortion or basement membrane thickening.   D. No viral inclusions or other organisms on routinely stained sections.    gavi/pkm          Lab Results   Component Value Date    HGB 11.2 (L) 03/26/2024    MCV 91.4 03/26/2024     03/26/2024    ALT 14 02/23/2023    AST 27 02/23/2023    HGBA1C 5.3 04/03/2024    INR 1.0 03/07/2015    TRIG 46 05/18/2020    FERRITIN 132.2 03/26/2024    IRON 64 03/26/2024    TIBC 269 03/26/2024      No results found.    REVIEW OF SYSTEMS  Review of Systems      ACTIVE PROBLEMS  Patient Active Problem List    Diagnosis     Gastroesophageal reflux disease with esophagitis without hemorrhage [K21.00]     Duodenal ulcer [K26.9]     Irritable bowel syndrome with diarrhea [K58.0]     Belching [R14.2]     Diarrhea [R19.7]     Abnormal CT of the abdomen [R93.5]     Encounter for screening for malignant neoplasm of colon [Z12.11]     Sigmoid diverticulosis [K57.30]     Personal history of colonic polyps [Z86.0100]          PAST MEDICAL HISTORY  Past  Medical History:   Diagnosis Date    Anxiety     Arthritis     GERD (gastroesophageal reflux disease)     Hypertension     Irritable bowel syndrome          SURGICAL HISTORY  Past Surgical History:   Procedure Laterality Date    ADENOIDECTOMY      APPENDECTOMY      BRONCHOSCOPY ENDOSCOPY      CHOLECYSTECTOMY      COLONOSCOPY N/A 12/8/2020    Procedure: COLONOSCOPY, polypectomies, biopsies;  Surgeon: Aditya Dias MD;  Location: [facility];  Service: Gastroenterology;  Laterality: N/A;  Diverticulosis  Cecal polyp x 2  Right colon biopsy  Left colon biopsy  Rectal polyp (cold snare)    ENDOSCOPY N/A 12/8/2020    Procedure: ESOPHAGOGASTRODUODENOSCOPY with biopsies ;  Surgeon: Aditya Dias MD;  Location: [facility];  Service: Gastroenterology;  Laterality: N/A;  Ulcerative esophagitis  Gastritis  Duodenal erosions  Duodenal biopsy  Gastric biopsy  Distal esophagus biopsy    TONSILLECTOMY      WISDOM TOOTH EXTRACTION           FAMILY HISTORY  Family History   Problem Relation Age of Onset    Colon cancer Neg Hx     Colon polyps Neg Hx          SOCIAL HISTORY  Social History     Occupational History    Not on file   Tobacco Use    Smoking status: Former     Types: Cigarettes     Passive exposure: Past    Smokeless tobacco: Never   Vaping Use    Vaping status: Never Used   Substance and Sexual Activity    Alcohol use: No    Drug use: No    Sexual activity: Yes     Partners: Male         CURRENT MEDICATIONS    Current Outpatient Medications:     ALPRAZolam (XANAX) 0.5 MG tablet, Take 0.5-1 tablets by mouth 3 (Three) Times a Day As Needed., Disp: , Rfl:     amLODIPine (NORVASC) 2.5 MG tablet, Take 1 tablet by mouth Daily., Disp: , Rfl:     atenolol (TENORMIN) 100 MG tablet, 0.5 tablets., Disp: , Rfl:     cholestyramine (QUESTRAN) 4 GM/DOSE powder, DISSOLVE 1 SCOOP OF POWDER IN LIQUID AND DRINK BY MOUTH NIGHTLY AS DIRECTED, Disp: 378 g, Rfl: 11    fluticasone (FLONASE) 50 MCG/ACT nasal spray, 2  "sprays by Alternating Nares route Daily., Disp: , Rfl:     hydrocortisone 2.5 % ointment, APPLY  OINTMENT EXTERNALLY TO AFFECTED AREA TWICE DAILY AS DIRECTED, Disp: 29 g, Rfl: 0    levocetirizine (XYZAL) 5 MG tablet, Take 1 tablet by mouth Every Evening., Disp: , Rfl:     levothyroxine (SYNTHROID, LEVOTHROID) 75 MCG tablet, Take 1 tablet by mouth Daily., Disp: , Rfl:     Multiple Vitamins-Minerals (CENTRUM SILVER PO), Take  by mouth., Disp: , Rfl:     omeprazole (priLOSEC) 40 MG capsule, TAKE 1 CAPSULE BY MOUTH TWICE DAILY WITH BREAKFAST WITH SUPPER, Disp: 180 capsule, Rfl: 3    VITAMIN D PO, Take  by mouth., Disp: , Rfl:     Wheat Dextrin (BENEFIBER PO), Take  by mouth Daily., Disp: , Rfl:     saccharomyces boulardii (FLORASTOR) 250 MG capsule, Take 1 capsule by mouth 2 (Two) Times a Day. (Patient not taking: Reported on 2/4/2025), Disp: , Rfl:   No current facility-administered medications for this visit.    Facility-Administered Medications Ordered in Other Visits:     lactated ringers infusion, 100 mL/hr, Intravenous, Continuous, Chris Valadez CRNA    ondansetron (ZOFRAN) injection 4 mg, 4 mg, Intravenous, Once PRN, Chris Valadez CRNA    ALLERGIES  Codeine, Methylprednisolone, Penicillins, Sulfa antibiotics, and Tetanus immune globulin    VITALS  Vitals:    02/04/25 1348   BP: 122/78   BP Location: Left arm   Patient Position: Sitting   Cuff Size: Adult   Weight: 75.8 kg (167 lb)   Height: 172.7 cm (68\")       PHYSICAL EXAM  Debilities/Disabilities Identified: None  Emotional Behavior: Appropriate  Wt Readings from Last 3 Encounters:   02/04/25 75.8 kg (167 lb)   06/19/24 75.3 kg (166 lb)   11/03/23 80.4 kg (177 lb 3.2 oz)     Ht Readings from Last 1 Encounters:   02/04/25 172.7 cm (68\")     Body mass index is 25.39 kg/m².  Physical Exam  Constitutional:       General: She is not in acute distress.     Appearance: Normal appearance. She is not ill-appearing.   HENT:      Head: Normocephalic and " atraumatic.      Mouth/Throat:      Mouth: Mucous membranes are moist.      Pharynx: No posterior oropharyngeal erythema.   Eyes:      General: No scleral icterus.  Cardiovascular:      Rate and Rhythm: Normal rate and regular rhythm.      Heart sounds: Normal heart sounds.   Pulmonary:      Effort: Pulmonary effort is normal.      Breath sounds: Normal breath sounds.   Abdominal:      General: Abdomen is flat. Bowel sounds are normal. There is no distension.      Palpations: Abdomen is soft. There is no mass.      Tenderness: There is no abdominal tenderness. There is no guarding or rebound. Negative signs include Rodriguez's sign.      Hernia: No hernia is present.   Musculoskeletal:      Cervical back: Neck supple.   Skin:     General: Skin is warm.      Capillary Refill: Capillary refill takes less than 2 seconds.   Neurological:      General: No focal deficit present.      Mental Status: She is alert and oriented to person, place, and time.   Psychiatric:         Mood and Affect: Mood normal.         Behavior: Behavior normal.         Thought Content: Thought content normal.         Judgment: Judgment normal.         CLINICAL DATA REVIEWED   reviewed previous lab results and integrated with today's visit, reviewed notes from other physicians and/or last GI encounter, reviewed previous endoscopy results and available photos, reviewed surgical pathology results from previous biopsies    ASSESSMENT  Diagnoses and all orders for this visit:    Irritable bowel syndrome with diarrhea    Personal history of colonic polyps    Gastroesophageal reflux disease with esophagitis without hemorrhage    Other orders  -     Wheat Dextrin (BENEFIBER PO); Take  by mouth Daily.          PLAN    IBS: Smaller scoop of cholestyramine, use PRN miralax or prune juice, avoid hard stools and straining, call if still difficulty    Return in about 6 months (around 8/4/2025).    I have discussed the above plan with the patient.  They verbalize  understanding and are in agreement with the plan.  They have been advised to contact the office for any questions, concerns, or changes related to their health.

## 2025-03-10 ENCOUNTER — TELEPHONE (OUTPATIENT)
Dept: GASTROENTEROLOGY | Facility: CLINIC | Age: 75
End: 2025-03-10
Payer: MEDICARE

## 2025-03-10 NOTE — TELEPHONE ENCOUNTER
No diarrhea, fever/chills, or abdominal pain.   States her dog has been sick and she is stressed and worried.

## 2025-03-10 NOTE — TELEPHONE ENCOUNTER
PT WANTS TO SPEAK TO JS  STATES SHE IS CONCERNED ABOUT HER BOWEL MOVEMENTS  SHE WANTED TO LET JS KNOW THAT SHE HAS BEEN TAKING 1/4 OF SCOOP OF HER MED: AND IT HAS BEEN HELPING A LOT  TODAY SHE HAD A REGULAR BOWEL MOVEMENT THIS MORNING   THEN SHE'S HAD 3 SMALLER ONES  NO DIARRHEA (STATED SHE HASN'T HAD DIARRHEA SINCE HER LAST OFFICE VISIT-02/04/2025)  SHE WANTED TO LET JS KNOW SHE IS VERY STRESSED AND UPSET AND SHE'S WONDERING IF THAT'S AFFECTING HER AND IF SO WHAT CAN SHE DO?  [phone]

## 2025-03-14 ENCOUNTER — TELEPHONE (OUTPATIENT)
Dept: GASTROENTEROLOGY | Facility: CLINIC | Age: 75
End: 2025-03-14
Payer: MEDICARE

## 2025-03-14 NOTE — TELEPHONE ENCOUNTER
Spoke with patient.  She is very emotionally upset today, as she had to take her dog to the vet this morning.  I advised her to not worry about taking another Omeprazole this morning, since she is uncertain.  I did advise her to take her evening dose, and then start her regular twice daily dosing, again, tomorrow.  Pt reassured and voiced understanding.

## 2025-03-14 NOTE — TELEPHONE ENCOUNTER
PT CANNOT REMEMBER IF SHE TOOK HER MORNING DOSE OF OMEPRAZOLE  SAYS SHE TAKES ONE BEFORE BREAKFAST AND ONE BEFORE DINNER  SHE DIDN'T WANT TO TAKE IT IF SHE HAD  SHE IS LEAVING FOR VET AT 9 AND WOULD LIKE A CALL BEFORE THEN IF POSSIBLE

## 2025-03-17 RX ORDER — CHOLESTYRAMINE 4 G/9G
POWDER, FOR SUSPENSION ORAL
Qty: 378 G | Refills: 3 | Status: SHIPPED | OUTPATIENT
Start: 2025-03-17

## 2025-04-07 DIAGNOSIS — K21.00 GASTROESOPHAGEAL REFLUX DISEASE WITH ESOPHAGITIS WITHOUT HEMORRHAGE: ICD-10-CM

## 2025-04-07 DIAGNOSIS — R14.2 BELCHING: ICD-10-CM

## 2025-04-07 RX ORDER — OMEPRAZOLE 40 MG/1
CAPSULE, DELAYED RELEASE ORAL
Qty: 180 CAPSULE | Refills: 1 | Status: SHIPPED | OUTPATIENT
Start: 2025-04-07

## 2025-07-16 RX ORDER — CHOLESTYRAMINE 4 G/9G
POWDER, FOR SUSPENSION ORAL
Qty: 378 G | Refills: 0 | Status: SHIPPED | OUTPATIENT
Start: 2025-07-16

## 2025-08-27 ENCOUNTER — OFFICE VISIT (OUTPATIENT)
Dept: GASTROENTEROLOGY | Facility: CLINIC | Age: 75
End: 2025-08-27
Payer: MEDICARE

## 2025-08-27 VITALS
WEIGHT: 163 LBS | HEIGHT: 68 IN | SYSTOLIC BLOOD PRESSURE: 130 MMHG | BODY MASS INDEX: 24.71 KG/M2 | DIASTOLIC BLOOD PRESSURE: 82 MMHG

## 2025-08-27 DIAGNOSIS — M54.6 CHRONIC THORACIC BACK PAIN, UNSPECIFIED BACK PAIN LATERALITY: ICD-10-CM

## 2025-08-27 DIAGNOSIS — Z86.0100 HISTORY OF COLONIC POLYPS: ICD-10-CM

## 2025-08-27 DIAGNOSIS — K58.0 IRRITABLE BOWEL SYNDROME WITH DIARRHEA: Primary | ICD-10-CM

## 2025-08-27 DIAGNOSIS — E87.1 HYPONATREMIA: ICD-10-CM

## 2025-08-27 DIAGNOSIS — G89.29 CHRONIC THORACIC BACK PAIN, UNSPECIFIED BACK PAIN LATERALITY: ICD-10-CM

## (undated) DEVICE — TRAP POLYP 4CHAMBER

## (undated) DEVICE — SYR LL 3CC

## (undated) DEVICE — SPNG GZ WOVN 4X4IN 12PLY 10/BX STRL

## (undated) DEVICE — BW-412T DISP COMBO CLEANING BRUSH: Brand: SINGLE USE COMBINATION CLEANING BRUSH

## (undated) DEVICE — GLV SURG SENSICARE PI MIC PF SZ7.5 LF STRL

## (undated) DEVICE — KT ORCA ORCAPOD DISP STRL

## (undated) DEVICE — FRCP BX RADJAW4 NDL 2.8 240CM LG OG BX40

## (undated) DEVICE — Device

## (undated) DEVICE — SNAR POLYP SENSATION STDOVL 27 240 BX40

## (undated) DEVICE — JACKT LAB F/R KNIT CUFF/COLR XLG BLU

## (undated) DEVICE — THE BITE BLOCK MAXI, LATEX FREE STRAP IS USED TO PROTECT THE ENDOSCOPE INSERTION TUBE FROM BEING BITTEN BY THE PATIENT.

## (undated) DEVICE — SUCTION CANISTER, 3000CC,SAFELINER: Brand: DEROYAL

## (undated) DEVICE — VIAL FORMALIN CAP 10P 40ML